# Patient Record
Sex: FEMALE | Race: WHITE | NOT HISPANIC OR LATINO | Employment: FULL TIME | URBAN - METROPOLITAN AREA
[De-identification: names, ages, dates, MRNs, and addresses within clinical notes are randomized per-mention and may not be internally consistent; named-entity substitution may affect disease eponyms.]

---

## 2019-07-22 ENCOUNTER — OFFICE VISIT (OUTPATIENT)
Dept: OBSTETRICS AND GYNECOLOGY | Facility: CLINIC | Age: 24
End: 2019-07-22
Payer: COMMERCIAL

## 2019-07-22 VITALS — HEIGHT: 64 IN | WEIGHT: 149.25 LBS | BODY MASS INDEX: 25.48 KG/M2

## 2019-07-22 DIAGNOSIS — Z11.3 SCREENING FOR STD (SEXUALLY TRANSMITTED DISEASE): ICD-10-CM

## 2019-07-22 DIAGNOSIS — Z30.017 ENCOUNTER FOR INITIAL PRESCRIPTION OF IMPLANTABLE SUBDERMAL CONTRACEPTIVE: ICD-10-CM

## 2019-07-22 DIAGNOSIS — Z01.419 ENCOUNTER FOR GYNECOLOGICAL EXAMINATION WITHOUT ABNORMAL FINDING: Primary | ICD-10-CM

## 2019-07-22 PROCEDURE — 88175 CYTOPATH C/V AUTO FLUID REDO: CPT

## 2019-07-22 PROCEDURE — 99999 PR PBB SHADOW E&M-NEW PATIENT-LVL III: CPT | Mod: PBBFAC,,, | Performed by: NURSE PRACTITIONER

## 2019-07-22 PROCEDURE — 99385 PREV VISIT NEW AGE 18-39: CPT | Mod: S$GLB,,, | Performed by: NURSE PRACTITIONER

## 2019-07-22 PROCEDURE — 99385 PR PREVENTIVE VISIT,NEW,18-39: ICD-10-PCS | Mod: S$GLB,,, | Performed by: NURSE PRACTITIONER

## 2019-07-22 PROCEDURE — 87491 CHLMYD TRACH DNA AMP PROBE: CPT

## 2019-07-22 PROCEDURE — 99999 PR PBB SHADOW E&M-NEW PATIENT-LVL III: ICD-10-PCS | Mod: PBBFAC,,, | Performed by: NURSE PRACTITIONER

## 2019-07-22 RX ORDER — NORETHINDRONE ACETATE AND ETHINYL ESTRADIOL AND FERROUS FUMARATE 1MG-20(21)
KIT ORAL
Refills: 2 | COMMUNITY
Start: 2019-07-01 | End: 2021-09-02 | Stop reason: SDUPTHER

## 2019-07-22 NOTE — PATIENT INSTRUCTIONS
The Range of Pap Test Results  When your Pap test is sent to the lab, the lab studies your cell samples and reports any abnormal cell changes. Your health care provider can discuss these changes with you. In some cases, an abnormal Pap test is due to an infection. More serious cell changes range from dysplasia to cancer. Talk to your health care provider about your Pap test.    Normal results  Cervical cells, even normal ones, are always changing. As they mature, normal squamous cells move from deeper layers within the cervix. Over time, these cells flatten and cover the surface of the cervix. Within the cervical canal, the cells are different. These glandular cells are taller and not as flat as the cells on the surface of the cervix. When a Pap test sample shows healthy cells of both types, the results are negative. Keep having Pap tests as often as directed.  Abnormal results  A positive Pap test result means some cells in the sample showed abnormal changes. These results are grouped by the type of cell change and the location, or extent, of the changes. Depending on the results, you may need further testing.  · Inflammation: Noncancerous changes are present. They may be due to normal cell repair. Or, they may be caused by an infection, such as HPV or yeast. Further testing may be needed. (Also called reactive cellular changes.)  · Atypical squamous cells: Test results are unclear. Cells on the surface of the cervix show changes, but their significance is not yet known. Testing for HPV and other sexually transmitted infections (STIs) may be needed. Treatment may be required. (Reported as ASC-US or ASC-H.)  · Atypical glandular cells: Cells lining the cervical canal show abnormal changes. Further testing is likely. You may also have treatment to destroy or remove problem cells. (Reported as AGC.)  · Mild dysplasia: Cells show distinct changes. More testing or HPV typing may be done. You may also have treatment to  destroy or remove problem cells. (Reported as low-grade ERROL or SHANTAL 1.)  · Moderate to severe dysplasia: Cells show precancerous changes. Or, noninvasive cancer (carcinoma in situ) may be present. Treatment to destroy or remove problem cells is likely. (Reported as high-grade ERROL or SHANTAL 2 or SHANTAL 3.)  · Cancer: Different types of cancer may be detected by your Pap test. More tests to assess the cancer's extent are likely. The type of treatment will depend on the test results and other factors, such as age and health history. (Reported as squamous cell carcinoma, endocervical adenocarcinoma in situ, or adenocarcinoma.)  Date Last Reviewed: 5/12/2015  © 2513-6677 [employer]. [address] [place] [address], [place] [address]. All rights reserved. This information is not intended as a substitute for professional medical care. Always follow your healthcare professional's instructions.        Breast Health: Breast Self-Awareness  What is breast self-awareness?  Breast self-awareness is knowing how your breasts normally look and feel. Your breasts change as you go through different stages of your life. So its important to learn what is normal for your breasts. Breast self-awareness helps you notice any changes in your breasts right away. Report any changes to your healthcare provider.  Why is breast self-awareness important?  Many experts now say that women should focus on breast self-awareness instead of doing a breast self-examination (BSE). These experts include the American Cancer Society, the U.S. Preventive Services Task Force, and the American Congress of Obstetricians and Gynecologists. Some experts even advise not teaching women to do a BSE. Thats because research hasnt shown a clear benefit to doing BSEs.  Breast self-awareness is different than a BSE. Breast self-awareness isnt about following a certain method and schedule. Its about knowing what's normal for your breasts. That way you can notice even  small changes right away. If you see any changes, report them to your healthcare provider.  Changes to look for  Call your healthcare provider if you find any changes in your breasts that concern you. These changes may include:  · A lump  · Nipple discharge other than breast milk, especially a bloody discharge  · Swelling  · A change in size or shape  · Skin irritation, such as redness, thickening, or dimpling of the skin  · Swollen lymph nodes in the armpit  · Nipple problems, such as pain or redness  If you find a lump  Contact your provider if you find lumpiness in one breast, feel something different in the tissue, or feel a definite lump. Sometimes lumpiness may be due to menstrual changes. But there may be reason for concern.  Your provider may want to see you right away if you have:  · Nipple discharge that is bloody  · Skin changes on your breast, such as dimpling or puckering  Its normal to be upset if you find a lump. But its important to contact your provider right away. Remember that most breast lumps are benign. This means they are not cancer.  Date Last Reviewed: 8/10/2015  © 7054-8083 [employer]. [address] [place] [address]. All rights reserved. This information is not intended as a substitute for professional medical care. Always follow your healthcare professional's instructions.        Etonogestrel implant  What is this medicine?  ETONOGESTREL (et oh noemí GUALBERTO trel) is a contraceptive (birth control) device. It is used to prevent pregnancy. It can be used for up to 3 years.  How should I use this medicine?  This device is inserted just under the skin on the inner side of your upper arm by a health care professional.  Talk to your pediatrician regarding the use of this medicine in children. Special care may be needed.  What side effects may I notice from receiving this medicine?  Side effects that you should report to your doctor or health care professional as soon as  possible:  · allergic reactions like skin rash, itching or hives, swelling of the face, lips, or tongue  · breast lumps  · changes in emotions or moods  · depressed mood  · heavy or prolonged menstrual bleeding  · pain, irritation, swelling, or bruising at the insertion site  · scar at site of insertion  · signs of infection at the insertion site such as fever, and skin redness, pain or discharge  · signs of pregnancy  · signs and symptoms of a blood clot such as breathing problems; changes in vision; chest pain; severe, sudden headache; pain, swelling, warmth in the leg; trouble speaking; sudden numbness or weakness of the face, arm or leg  · signs and symptoms of liver injury like dark yellow or brown urine; general ill feeling or flu-like symptoms; light-colored stools; loss of appetite; nausea; right upper belly pain; unusually weak or tired; yellowing of the eyes or skin  · unusual vaginal bleeding, discharge  · signs and symptoms of a stroke like changes in vision; confusion; trouble speaking or understanding; severe headaches; sudden numbness or weakness of the face, arm or leg; trouble walking; dizziness; loss of balance or coordination  Side effects that usually do not require medical attention (Report these to your doctor or health care professional if they continue or are bothersome.):  · acne  · back pain  · breast pain  · changes in weight  · dizziness  · general ill feeling or flu-like symptoms  · headache  · irregular menstrual bleeding  · nausea  · sore throat  · vaginal irritation or inflammation  What may interact with this medicine?  Do not take this medicine with any of the following medications:  · amprenavir  · bosentan  · fosamprenavir  This medicine may also interact with the following medications:  · barbiturate medicines for inducing sleep or treating seizures  · certain medicines for fungal infections like ketoconazole and itraconazole  · griseofulvin  · medicines to treat seizures like  carbamazepine, felbamate, oxcarbazepine, phenytoin, topiramate  · modafinil  · phenylbutazone  · rifampin  · some medicines to treat HIV infection like atazanavir, indinavir, lopinavir, nelfinavir, tipranavir, ritonavir  · Angela's wort  What if I miss a dose?  This does not apply.  Where should I keep my medicine?  This drug is given in a hospital or clinic and will not be stored at home.  What should I tell my health care provider before I take this medicine?  They need to know if you have any of these conditions:  · abnormal vaginal bleeding  · blood vessel disease or blood clots  · cancer of the breast, cervix, or liver  · depression  · diabetes  · gallbladder disease  · headaches  · heart disease or recent heart attack  · high blood pressure  · high cholesterol  · kidney disease  · liver disease  · renal disease  · seizures  · tobacco smoker  · an unusual or allergic reaction to etonogestrel, other hormones, anesthetics or antiseptics, medicines, foods, dyes, or preservatives  · pregnant or trying to get pregnant  · breast-feeding  What should I watch for while using this medicine?  This product does not protect you against HIV infection (AIDS) or other sexually transmitted diseases.  You should be able to feel the implant by pressing your fingertips over the skin where it was inserted. Contact your doctor if you cannot feel the implant, and use a non-hormonal birth control method (such as condoms) until your doctor confirms that the implant is in place. If you feel that the implant may have broken or become bent while in your arm, contact your healthcare provider.  NOTE:This sheet is a summary. It may not cover all possible information. If you have questions about this medicine, talk to your doctor, pharmacist, or health care provider. Copyright© 2017 [employer]

## 2019-07-22 NOTE — PROGRESS NOTES
"CC: Well woman exam    Chichi Greer is a 23 y.o. female  presents for well woman exam.  LMP: Patient's last menstrual period was 2019..  No issues, problems, or complaints.    Wants to change from ocp to nexplanon. Going [place] of [place] on [place], will get back with us once she has returned.     History reviewed. No pertinent past medical history.  History reviewed. No pertinent surgical history.  Social History     Socioeconomic History    Marital status: Single     Spouse name: Not on file    Number of children: Not on file    Years of education: Not on file    Highest education level: Not on file   Occupational History    Not on file   Social Needs    Financial resource strain: Not on file    Food insecurity:     Worry: Not on file     Inability: Not on file    Transportation needs:     Medical: Not on file     Non-medical: Not on file   Tobacco Use    Smoking status: Never Smoker    Smokeless tobacco: Never Used   Substance and Sexual Activity    Alcohol use: Yes    Drug use: Never    Sexual activity: Yes     Birth control/protection: OCP   Lifestyle    Physical activity:     Days per week: Not on file     Minutes per session: Not on file    Stress: Not on file   Relationships    Social connections:     Talks on phone: Not on file     Gets together: Not on file     Attends Hoahaoism service: Not on file     Active member of club or organization: Not on file     Attends meetings of clubs or organizations: Not on file     Relationship status: Not on file   Other Topics Concern    Not on file   Social History Narrative    Not on file     History reviewed. No pertinent family history.  OB History        0    Para   0    Term   0       0    AB   0    Living   0       SAB   0    TAB   0    Ectopic   0    Multiple   0    Live Births   0                 Ht 5' 4" (1.626 m)   Wt 67.7 kg (149 lb 4 oz)   LMP 2019   BMI 25.62 kg/m²       ROS:  GENERAL: Denies weight gain " or weight loss. Feeling well overall.   SKIN: Denies rash or lesions.   HEAD: Denies head injury or headache.   NODES: Denies enlarged lymph nodes.   CHEST: Denies chest pain or shortness of breath.   CARDIOVASCULAR: Denies palpitations or left sided chest pain.   ABDOMEN: No abdominal pain, constipation, diarrhea, nausea, vomiting or rectal bleeding.   URINARY: No frequency, dysuria, hematuria, or burning on urination.  REPRODUCTIVE: See HPI.   BREASTS: The patient performs breast self-examination and denies pain, lumps, or nipple discharge.   HEMATOLOGIC: No easy bruisability or excessive bleeding.   MUSCULOSKELETAL: Denies joint pain or swelling.   NEUROLOGIC: Denies syncope or weakness.   PSYCHIATRIC: Denies depression, anxiety or mood swings.    PHYSICAL EXAM:  APPEARANCE: Well nourished, well developed, in no acute distress.  AFFECT: WNL, alert and oriented x 3  SKIN: No acne or hirsutism  NECK: Neck symmetric without masses or thyromegaly  NODES: No inguinal, cervical, axillary, or femoral lymph node enlargement  CHEST: Good respiratory effect  ABDOMEN: Soft.  No tenderness or masses.  No hepatosplenomegaly.  No hernias.  BREASTS: Symmetrical, no skin changes or visible lesions.  No palpable masses, nipple discharge bilaterally.  PELVIC: Normal external genitalia without lesions.  Normal hair distribution.  Adequate perineal body, normal urethral meatus.  Vagina moist and well rugated without lesions or discharge.  Cervix pink, without lesions, discharge or tenderness.  No significant cystocele or rectocele.  Bimanual exam shows uterus to be normal size, regular, mobile and nontender.  Adnexa without masses or tenderness.    EXTREMITIES: No edema.  Physical Exam    1. Encounter for gynecological examination without abnormal finding  Liquid-based pap smear, screening   2. Screening for STD (sexually transmitted disease)  C. trachomatis/N. gonorrhoeae by AMP DNA   3. Encounter for initial prescription of  implantable subdermal contraceptive      AND PLAN:    Patient was counseled today on A.C.S. Pap guidelines and recommendations for yearly pelvic exams, mammograms and monthly self breast exams; to see her PCP for other health maintenance.

## 2019-07-23 ENCOUNTER — PATIENT MESSAGE (OUTPATIENT)
Dept: OBSTETRICS AND GYNECOLOGY | Facility: CLINIC | Age: 24
End: 2019-07-23

## 2019-07-23 LAB
C TRACH DNA SPEC QL NAA+PROBE: NOT DETECTED
N GONORRHOEA DNA SPEC QL NAA+PROBE: NOT DETECTED

## 2019-07-30 ENCOUNTER — PATIENT MESSAGE (OUTPATIENT)
Dept: OBSTETRICS AND GYNECOLOGY | Facility: CLINIC | Age: 24
End: 2019-07-30

## 2019-08-29 ENCOUNTER — TELEPHONE (OUTPATIENT)
Dept: OBSTETRICS AND GYNECOLOGY | Facility: CLINIC | Age: 24
End: 2019-08-29

## 2019-08-29 NOTE — TELEPHONE ENCOUNTER
----- Message from Michael Russell sent at 8/29/2019  3:03 PM CDT -----  Contact: Bharti  She is calling stating that they received the patient's enrollment for to get the Nexplanon birth control device but there is some missing information needed to complete the processing,please advise as soon as possible #[phone] &  fax#[phone]

## 2019-10-18 ENCOUNTER — TELEPHONE (OUTPATIENT)
Dept: OBSTETRICS AND GYNECOLOGY | Facility: CLINIC | Age: 24
End: 2019-10-18

## 2019-10-18 NOTE — TELEPHONE ENCOUNTER
Patient's Nexplanon received at [facility].  Placed in med room. Attempted to contact patient.  No answer. No voicemail, so unable to leave message. Patient needs to be notified and scheduled.

## 2020-07-01 ENCOUNTER — OFFICE VISIT (OUTPATIENT)
Dept: URGENT CARE | Facility: CLINIC | Age: 25
End: 2020-07-01
Payer: COMMERCIAL

## 2020-07-01 VITALS
TEMPERATURE: 99 F | HEIGHT: 64 IN | RESPIRATION RATE: 19 BRPM | WEIGHT: 149 LBS | BODY MASS INDEX: 25.44 KG/M2 | SYSTOLIC BLOOD PRESSURE: 122 MMHG | OXYGEN SATURATION: 98 % | DIASTOLIC BLOOD PRESSURE: 79 MMHG | HEART RATE: 52 BPM

## 2020-07-01 DIAGNOSIS — Z11.59 SCREENING FOR VIRAL DISEASE: Primary | ICD-10-CM

## 2020-07-01 PROCEDURE — U0003 INFECTIOUS AGENT DETECTION BY NUCLEIC ACID (DNA OR RNA); SEVERE ACUTE RESPIRATORY SYNDROME CORONAVIRUS 2 (SARS-COV-2) (CORONAVIRUS DISEASE [COVID-19]), AMPLIFIED PROBE TECHNIQUE, MAKING USE OF HIGH THROUGHPUT TECHNOLOGIES AS DESCRIBED BY CMS-2020-01-R: HCPCS

## 2020-07-01 PROCEDURE — 99202 PR OFFICE/OUTPT VISIT, NEW, LEVL II, 15-29 MIN: ICD-10-PCS | Mod: S$GLB,,, | Performed by: NURSE PRACTITIONER

## 2020-07-01 PROCEDURE — 99202 OFFICE O/P NEW SF 15 MIN: CPT | Mod: S$GLB,,, | Performed by: NURSE PRACTITIONER

## 2020-07-01 NOTE — PROGRESS NOTES
"Subjective:       Patient ID: Chichi Greer is a 24 y.o. female.    Vitals:  height is 5' 4" (1.626 m) and weight is 67.6 kg (149 lb). Her temperature is 98.6 °F (37 °C). Her blood pressure is 122/79 and her pulse is 52 (abnormal). Her respiration is 19 and oxygen saturation is 98%.     Chief Complaint: No chief complaint on file.    Pt states she has reshma exposed to a positive covid person. For the last 2 days she has been having a running nose. Pt hasn't taken anything for her symptoms.  Provider note begins below  Pt denies fever, cough or SOB.      Constitution: Negative for chills, fatigue and fever.   HENT: Negative for congestion and sore throat.    Neck: Negative for painful lymph nodes.   Cardiovascular: Negative for chest pain and leg swelling.   Eyes: Negative for double vision and blurred vision.   Respiratory: Negative for cough and shortness of breath.    Gastrointestinal: Negative for nausea, vomiting and diarrhea.   Genitourinary: Negative for dysuria, frequency, urgency and history of kidney stones.   Musculoskeletal: Negative for joint pain, joint swelling, muscle cramps and muscle ache.   Skin: Negative for color change, pale, rash and bruising.   Allergic/Immunologic: Negative for seasonal allergies.   Neurological: Negative for dizziness, history of vertigo, light-headedness, passing out and headaches.   Hematologic/Lymphatic: Negative for swollen lymph nodes.   Psychiatric/Behavioral: Negative for nervous/anxious, sleep disturbance and depression. The patient is not nervous/anxious.        Objective:      Physical Exam   Constitutional: She is oriented to person, place, and time.   HENT:   Head: Normocephalic and atraumatic.   Cardiovascular: Normal rate.   Pulmonary/Chest: Effort normal. No respiratory distress.   Abdominal: Normal appearance.   Neurological: She is alert and oriented to person, place, and time.   Skin: Skin is warm and dry.   Psychiatric: Her behavior is normal. Mood normal. "         Assessment:       1. Screening for viral disease        Plan:       Counseled patient and answered questions in regards to COVID-19 testing and diagnosis.    Screening for viral disease  -     COVID-19 Routine Screening         Patient Instructions   Guidelines for General Prevention of COVID-19    o Take steps to protect yourself from COVID-19. Perform hand hygiene frequently. Wash your hands often with soap and water for at least 20 seconds of use and alcohol-based hand , covering all surfaces of your hands and rubbing them together until they feel dry.  o Avoid touching your eyes, nose, and mouth with unwashed hands.  o Avoid close contact with people and stay home if youre sick, except to get medical care.   o Cover coughs and sneezes with a tissue, or use the inside of your elbow. Immediately wash your hands or use hand .     For more information, see CDC link below:    https://www.cdc.gov/coronavirus/2019-ncov/hcp/guidance-prevent-spread.html#precautions

## 2020-07-05 LAB — SARS-COV-2 RNA RESP QL NAA+PROBE: NEGATIVE

## 2020-07-06 ENCOUNTER — TELEPHONE (OUTPATIENT)
Dept: URGENT CARE | Facility: CLINIC | Age: 25
End: 2020-07-06

## 2020-07-06 NOTE — TELEPHONE ENCOUNTER
Patient has reviewed their negative COVID19 result in My Chart.    Your test was NEGATIVE for COVID-19 (coronavirus).  If you still have symptoms, treat with rest, fluids, and over-the-counter medications.  Continue to follow local guidelines and practice proper handwashing.     If your symptoms worsen or if you have any other concerns, please contact [facility] at [phone].     Sincerely,    Joe Roldan III, NP

## 2020-08-12 ENCOUNTER — OFFICE VISIT (OUTPATIENT)
Dept: URGENT CARE | Facility: CLINIC | Age: 25
End: 2020-08-12
Payer: COMMERCIAL

## 2020-08-12 VITALS
HEIGHT: 64 IN | TEMPERATURE: 99 F | RESPIRATION RATE: 18 BRPM | OXYGEN SATURATION: 99 % | BODY MASS INDEX: 25.61 KG/M2 | WEIGHT: 150 LBS | HEART RATE: 58 BPM

## 2020-08-12 DIAGNOSIS — J03.90 EXUDATIVE TONSILLITIS: Primary | ICD-10-CM

## 2020-08-12 LAB
CTP QC/QA: YES
CTP QC/QA: YES
HETEROPH AB SER QL: NEGATIVE
MOLECULAR STREP A: NEGATIVE

## 2020-08-12 PROCEDURE — 86308 HETEROPHILE ANTIBODY SCREEN: CPT | Mod: QW,S$GLB,, | Performed by: PHYSICIAN ASSISTANT

## 2020-08-12 PROCEDURE — 99214 OFFICE O/P EST MOD 30 MIN: CPT | Mod: S$GLB,,, | Performed by: PHYSICIAN ASSISTANT

## 2020-08-12 PROCEDURE — 86308 POCT INFECTIOUS MONONUCLEOSIS: ICD-10-PCS | Mod: QW,S$GLB,, | Performed by: PHYSICIAN ASSISTANT

## 2020-08-12 PROCEDURE — 87651 STREP A DNA AMP PROBE: CPT | Mod: QW,S$GLB,, | Performed by: PHYSICIAN ASSISTANT

## 2020-08-12 PROCEDURE — 99214 PR OFFICE/OUTPT VISIT, EST, LEVL IV, 30-39 MIN: ICD-10-PCS | Mod: S$GLB,,, | Performed by: PHYSICIAN ASSISTANT

## 2020-08-12 PROCEDURE — 87651 POCT STREP A MOLECULAR: ICD-10-PCS | Mod: QW,S$GLB,, | Performed by: PHYSICIAN ASSISTANT

## 2020-08-12 RX ORDER — AMOXICILLIN 500 MG/1
500 CAPSULE ORAL EVERY 12 HOURS
Qty: 20 CAPSULE | Refills: 0 | Status: SHIPPED | OUTPATIENT
Start: 2020-08-12 | End: 2020-08-22

## 2020-08-12 NOTE — PATIENT INSTRUCTIONS
- Rest.    - Drink plenty of fluids.    - Acetaminophen (tylenol) or Ibuprofen (advil,motrin) as directed as needed for fever/pain. Avoid tylenol if you have a history of liver disease. Do not take ibuprofen if you have a history of GI bleeding, kidney disease, or if you take blood thinners.   - take ibuprofen 600-800 mg every 6-8 hours for pain and inflammation.  You can also take Tylenol/acetaminophen 650-1000 mg every 6-8 hours for added pain relief.    - You have been given an antibiotic (AMOXICILLIN) to treat your condition today.    - Please complete the antibiotic as directed on the bottle.   - If you are female and on oral birth control pills, use additional methods to prevent pregnancy while on antibiotics and for one cycle after.   - you can take over-the-counter probiotics during and after antibiotic use to help preserve gut rola and reduce gastrointestinal symptoms  - You were given a prescription for antibiotic, but do not start taking it yet.  Wait 2-3 days to see if your symptoms improve without it.  If they don't or you get significantly worse, then start the antibiotics.    - Follow up with your PCP or specialty clinic as directed in the next 1-2 weeks if not improved or as needed.  You can call [phone] to schedule an appointment with the appropriate provider.    - Go to the ER or seek medical attention immediately if you develop new or worsening symptoms.    - You must understand that you have received an Urgent Care treatment only and that you may be released before all of your medical problems are known or treated.   - You, the patient, will arrange for follow up care as instructed.   - If your condition worsens or fails to improve we recommend that you receive another evaluation at the ER immediately or contact your PCP to discuss your concerns or return here.         Self-Care for Sore Throats    Sore throats happen for many reasons, such as colds, allergies, and infections caused by  viruses or bacteria. In any case, your throat becomes red and sore. Your goal for self-care is to reduce your discomfort while giving your throat a chance to heal.  Moisten and soothe your throat  Tips include the following:  · Try a sip of water first thing after waking up.  · Keep your throat moist by drinking 6 or more glasses of clear liquids every day.  · Run a cool-air humidifier in your room overnight.  · Avoid cigarette smoke.   · Suck on throat lozenges, cough drops, hard candy, ice chips, or frozen fruit-juice bars. Use the sugar-free versions if your diet or medical condition requires them.  Gargle to ease irritation  Gargling every hour or 2 can ease irritation. Try gargling with 1 of these solutions:  · 1/4 teaspoon of salt in 1/2 cup of warm water  · An over-the-counter anesthetic gargle  Use medicine for more relief  Over-the-counter medicine can reduce sore throat symptoms. Ask your pharmacist if you have questions about which medicine to use:  · Ease pain with anesthetic sprays. Aspirin or an aspirin substitute also helps. Remember, never give aspirin to anyone 18 or younger, or if you are already taking blood thinners.   · For sore throats caused by allergies, try antihistamines to block the allergic reaction.  · Remember: unless a sore throat is caused by a bacterial infection, antibiotics wont help you.  Prevent future sore throats  Prevention tips include the following:  · Stop smoking or reduce contact with secondhand smoke. Smoke irritates the tender throat lining.  · Limit contact with pets and with allergy-causing substances, such as pollen and mold.  · When youre around someone with a sore throat or cold, wash your hands often to keep viruses or bacteria from spreading.  · Dont strain your vocal cords.  Call your healthcare provider  Contact your healthcare provider if you have:  · A temperature over 101°F (38.3°C)  · White spots on the throat  · Great difficulty swallowing  · Trouble  breathing  · A skin rash  · Recent exposure to someone else with strep bacteria  · Severe hoarseness and swollen glands in the neck or jaw   Date Last Reviewed: 8/1/2016 © 2000-2017 [employer]. [address] [place] [address], [place] [address]. All rights reserved. This information is not intended as a substitute for professional medical care. Always follow your healthcare professional's instructions.        When You Have a Sore Throat    A sore throat can be painful. There are many reasons why you may have a sore throat. Your healthcare provider will work with you to find the cause of your sore throat. He or she will also find the best treatment for you.  What causes a sore throat?  Sore throats can be caused or worsened by:  · Cold or flu viruses  · Bacteria  · Irritants such as tobacco smoke or air pollution  · Acid reflux  A healthy throat  The tonsils are on the sides of the throat near the base of the tongue. They collect viruses and bacteria and help fight infection. The throat (pharynx) is the passage for air. Mucus from the nasal cavity also moves down the passage.  An inflamed throat  The tonsils and pharynx can become inflamed due to a cold or flu virus. Postnasal drip (excess mucus draining from the nasal cavity) can irritate the throat. It can also make the throat or tonsils more likely to be infected by bacteria. Severe, untreated tonsillitis in children or adults can cause a pocket of pus (abscess) to form near the tonsil.  Your evaluation  A medical evaluation can help find the cause of your sore throat. It can also help your healthcare provider choose the best treatment for you. The evaluation may include a health history, physical exam, and diagnostic tests.  Health history  Your healthcare provider may ask you the following:  · How long has the sore throat lasted and how have you been treating it?  · Do you have any other symptoms, such as body aches, fever, or cough?  · Does your sore  throat recur? If so, how often? How many days of school or work have you missed because of a sore throat?  · Do you have trouble eating or swallowing?  · Have you been told that you snore or have other sleep problems?  · Do you have bad breath?  · Do you cough up bad-tasting mucus?  Physical exam  During the exam, your healthcare provider checks your ears, nose, and throat for problems. He or she also checks for swelling in the neck, and may listen to your chest.  Possible tests  Other tests your healthcare provider may perform include:  · A throat swab to check for bacteria such as streptococcus (the bacteria that causes strep throat)  · A blood test to check for mononucleosis (a viral infection)  · A chest X-ray to rule out pneumonia, especially if you have a cough  Treating a sore throat  Treatment depends on many factors. What is the likely cause? Is the problem recent? Does it keep coming back? In many cases, the best thing to do is to treat the symptoms, rest, and let the problem heal itself. Antibiotics may help clear up some bacterial infections. For cases of severe or recurring tonsillitis, the tonsils may need to be removed.  Relieving your symptoms  · Dont smoke, and avoid secondhand smoke.  · For children, try throat sprays or Popsicles. Adults and older children may try lozenges.  · Drink warm liquids to soothe the throat and help thin mucus. Avoid alcohol, spicy foods, and acidic drinks such as orange juice. These can irritate the throat.  · Gargle with warm saltwater (1 teaspoon of salt to 8 ounces of warm water).  · Use a humidifier to keep air moist and relieve throat dryness.  · Try over-the-counter pain relievers such as acetaminophen or ibuprofen. Use as directed, and dont exceed the recommended dose. Dont give aspirin to children.   Are antibiotics needed?  If your sore throat is due to a bacterial infection, antibiotics may speed healing and prevent complications. Although group A  "streptococcus ("strep throat" or GAS) is the major treatable infection for a sore throat, GAS causes only 5% to 15% of sore throats in adults who seek medical care. Most sore throats are caused by cold or flu viruses. And antibiotics dont treat viral illness. In fact, using antibiotics when theyre not needed may produce bacteria that are harder to kill. Your healthcare provider will prescribe antibiotics only if he or she thinks they are likely to help.  If antibiotics are prescribed  Take the medicine exactly as directed. Be sure to finish your prescription even if youre feeling better. And be sure to ask your healthcare provider or pharmacist what side effects are common and what to do about them.  Is surgery needed?  In some cases, tonsils need to be removed. This is often done as outpatient (same-day) surgery. Your healthcare provider may advise removing the tonsils in cases of:  · Several severe bouts of tonsillitis in a year. Severe episodes include those that lead to missed days of school or work, or that need to be treated with antibiotics.  · Tonsillitis that causes breathing problems during sleep  · Tonsillitis caused by food particles collecting in pouches in the tonsils (cryptic tonsillitis)  Call your healthcare provider if any of the following occur:  · Symptoms worsen, or new symptoms develop.  · Swollen tonsils make breathing difficult.  · The pain is severe enough to keep you from drinking liquids.  · A skin rash, hives, or wheezing develops. Any of these could signal an allergic reaction to antibiotics.  · Symptoms dont improve within a week.  · Symptoms dont improve within 2 to 3 days of starting antibiotics.   Date Last Reviewed: 10/1/2016  © 4699-8336 "[employer] [address] [place] [address], [place] [address]. All rights reserved. This information is not intended as a substitute for professional medical care. Always follow your healthcare professional's instructions.        "

## 2020-08-12 NOTE — PROGRESS NOTES
"Subjective:       Patient ID: Chichi Greer is a 24 y.o. female.    Vitals:  height is 5' 4" (1.626 m) and weight is 68 kg (150 lb). Her temperature is 98.5 °F (36.9 °C). Her pulse is 58 (abnormal). Her respiration is 18 and oxygen saturation is 99%.     Chief Complaint: Sore Throat    Patient presents with chief complaint of sore throat.  She states this began last night.  Sore throat was worse last night and this morning. She also saw white exudate on both tonsils.  She denies other associated symptoms.  Patient is going out of town in the next few days, which provoked her to come in today to get checked out.  Patient is going back packing in [place] and will not have good access to healthcare.  She denies any fever or difficulty swallowing.  No nasal congestion or cough.       Constitution: Negative for chills, sweating, fatigue and fever.   HENT: Negative for sinus pain, sinus pressure and voice change.    Neck: Negative for neck stiffness and painful lymph nodes.   Cardiovascular: Negative for chest pain, leg swelling and sob on exertion.   Eyes: Negative for eye discharge, eye itching, eye pain and eye redness.   Respiratory: Negative for chest tightness, sputum production, bloody sputum, COPD, wheezing and asthma.    Gastrointestinal: Negative for nausea.   Genitourinary: Negative for dysuria, frequency, urgency and flank pain.   Musculoskeletal: Negative for muscle ache.   Skin: Negative for rash.   Allergic/Immunologic: Negative for seasonal allergies and asthma.   Neurological: Negative for dizziness, altered mental status and loss of consciousness.   Hematologic/Lymphatic: Negative for swollen lymph nodes.   Psychiatric/Behavioral: Negative for altered mental status.       Objective:      Physical Exam   Constitutional: She is oriented to person, place, and time. She appears well-developed. She is cooperative.  Non-toxic appearance. She does not appear ill. No distress.   HENT:   Head: Normocephalic and " atraumatic.   Ears:   Right Ear: Hearing, tympanic membrane, external ear and ear canal normal.   Left Ear: Hearing, tympanic membrane, external ear and ear canal normal.   Nose: Nose normal. No mucosal edema, rhinorrhea or nasal deformity. No epistaxis. Right sinus exhibits no maxillary sinus tenderness and no frontal sinus tenderness. Left sinus exhibits no maxillary sinus tenderness and no frontal sinus tenderness.   Mouth/Throat: Uvula is midline and mucous membranes are normal. No trismus in the jaw. Normal dentition. No uvula swelling. Posterior oropharyngeal erythema present. No oropharyngeal exudate, posterior oropharyngeal edema, tonsillar abscesses or cobblestoning. Tonsils are 1+ on the right. Tonsils are 1+ on the left. Tonsillar exudate.   Uvula midline      Comments: Uvula midline  Eyes: Conjunctivae and lids are normal. No scleral icterus.   Neck: Trachea normal, full passive range of motion without pain and phonation normal. Neck supple. No neck rigidity. No edema and no erythema present.   Cardiovascular: Normal rate, regular rhythm, normal heart sounds and normal pulses.   Pulmonary/Chest: Effort normal and breath sounds normal. No accessory muscle usage or stridor. No tachypnea and no bradypnea. No respiratory distress. She has no decreased breath sounds. She has no wheezes. She has no rhonchi. She has no rales.   Abdominal: Normal appearance.   Musculoskeletal: Normal range of motion.         General: No deformity.   Lymphadenopathy:        Head (right side): No submandibular, no preauricular and no posterior auricular adenopathy present.        Head (left side): No submandibular, no preauricular and no posterior auricular adenopathy present.     She has cervical adenopathy.        Right cervical: Superficial cervical adenopathy present. No posterior cervical adenopathy present.       Left cervical: Superficial cervical adenopathy present. No posterior cervical adenopathy present.   Neurological:  She is alert and oriented to person, place, and time. She exhibits normal muscle tone. Coordination normal.   Skin: Skin is warm, dry, intact, not diaphoretic and not pale. Psychiatric: Her speech is normal and behavior is normal. Judgment and thought content normal.   Nursing note and vitals reviewed.        Results for orders placed or performed in visit on 08/12/20   POCT Strep A, Molecular   Result Value Ref Range    Molecular Strep A, POC Negative Negative     Acceptable Yes    POCT Infectious mononucleosis antibody   Result Value Ref Range    Monospot Negative Negative     Acceptable Yes        Assessment:       1. Exudative tonsillitis        Plan:         Exudative tonsillitis  -     POCT Strep A, Molecular  -     amoxicillin (AMOXIL) 500 MG capsule; Take 1 capsule (500 mg total) by mouth every 12 (twelve) hours. for 10 days  Dispense: 20 capsule; Refill: 0  -     POCT Infectious mononucleosis antibody      Patient Instructions     - Rest.    - Drink plenty of fluids.    - Acetaminophen (tylenol) or Ibuprofen (advil,motrin) as directed as needed for fever/pain. Avoid tylenol if you have a history of liver disease. Do not take ibuprofen if you have a history of GI bleeding, kidney disease, or if you take blood thinners.   - take ibuprofen 600-800 mg every 6-8 hours for pain and inflammation.  You can also take Tylenol/acetaminophen 650-1000 mg every 6-8 hours for added pain relief.    - You have been given an antibiotic (AMOXICILLIN) to treat your condition today.    - Please complete the antibiotic as directed on the bottle.   - If you are female and on oral birth control pills, use additional methods to prevent pregnancy while on antibiotics and for one cycle after.   - you can take over-the-counter probiotics during and after antibiotic use to help preserve gut rola and reduce gastrointestinal symptoms  - You were given a prescription for antibiotic, but do not start taking  it yet.  Wait 2-3 days to see if your symptoms improve without it.  If they don't or you get significantly worse, then start the antibiotics.    - Follow up with your PCP or specialty clinic as directed in the next 1-2 weeks if not improved or as needed.  You can call [phone] to schedule an appointment with the appropriate provider.    - Go to the ER or seek medical attention immediately if you develop new or worsening symptoms.    - You must understand that you have received an Urgent Care treatment only and that you may be released before all of your medical problems are known or treated.   - You, the patient, will arrange for follow up care as instructed.   - If your condition worsens or fails to improve we recommend that you receive another evaluation at the ER immediately or contact your PCP to discuss your concerns or return here.         Self-Care for Sore Throats    Sore throats happen for many reasons, such as colds, allergies, and infections caused by viruses or bacteria. In any case, your throat becomes red and sore. Your goal for self-care is to reduce your discomfort while giving your throat a chance to heal.  Moisten and soothe your throat  Tips include the following:  · Try a sip of water first thing after waking up.  · Keep your throat moist by drinking 6 or more glasses of clear liquids every day.  · Run a cool-air humidifier in your room overnight.  · Avoid cigarette smoke.   · Suck on throat lozenges, cough drops, hard candy, ice chips, or frozen fruit-juice bars. Use the sugar-free versions if your diet or medical condition requires them.  Gargle to ease irritation  Gargling every hour or 2 can ease irritation. Try gargling with 1 of these solutions:  · 1/4 teaspoon of salt in 1/2 cup of warm water  · An over-the-counter anesthetic gargle  Use medicine for more relief  Over-the-counter medicine can reduce sore throat symptoms. Ask your pharmacist if you have questions about which medicine  to use:  · Ease pain with anesthetic sprays. Aspirin or an aspirin substitute also helps. Remember, never give aspirin to anyone 18 or younger, or if you are already taking blood thinners.   · For sore throats caused by allergies, try antihistamines to block the allergic reaction.  · Remember: unless a sore throat is caused by a bacterial infection, antibiotics wont help you.  Prevent future sore throats  Prevention tips include the following:  · Stop smoking or reduce contact with secondhand smoke. Smoke irritates the tender throat lining.  · Limit contact with pets and with allergy-causing substances, such as pollen and mold.  · When youre around someone with a sore throat or cold, wash your hands often to keep viruses or bacteria from spreading.  · Dont strain your vocal cords.  Call your healthcare provider  Contact your healthcare provider if you have:  · A temperature over 101°F (38.3°C)  · White spots on the throat  · Great difficulty swallowing  · Trouble breathing  · A skin rash  · Recent exposure to someone else with strep bacteria  · Severe hoarseness and swollen glands in the neck or jaw   Date Last Reviewed: 8/1/2016  © 4648-2220 [employer]. [address] [place] [address]. All rights reserved. This information is not intended as a substitute for professional medical care. Always follow your healthcare professional's instructions.        When You Have a Sore Throat    A sore throat can be painful. There are many reasons why you may have a sore throat. Your healthcare provider will work with you to find the cause of your sore throat. He or she will also find the best treatment for you.  What causes a sore throat?  Sore throats can be caused or worsened by:  · Cold or flu viruses  · Bacteria  · Irritants such as tobacco smoke or air pollution  · Acid reflux  A healthy throat  The tonsils are on the sides of the throat near the base of the tongue. They collect viruses and  bacteria and help fight infection. The throat (pharynx) is the passage for air. Mucus from the nasal cavity also moves down the passage.  An inflamed throat  The tonsils and pharynx can become inflamed due to a cold or flu virus. Postnasal drip (excess mucus draining from the nasal cavity) can irritate the throat. It can also make the throat or tonsils more likely to be infected by bacteria. Severe, untreated tonsillitis in children or adults can cause a pocket of pus (abscess) to form near the tonsil.  Your evaluation  A medical evaluation can help find the cause of your sore throat. It can also help your healthcare provider choose the best treatment for you. The evaluation may include a health history, physical exam, and diagnostic tests.  Health history  Your healthcare provider may ask you the following:  · How long has the sore throat lasted and how have you been treating it?  · Do you have any other symptoms, such as body aches, fever, or cough?  · Does your sore throat recur? If so, how often? How many days of school or work have you missed because of a sore throat?  · Do you have trouble eating or swallowing?  · Have you been told that you snore or have other sleep problems?  · Do you have bad breath?  · Do you cough up bad-tasting mucus?  Physical exam  During the exam, your healthcare provider checks your ears, nose, and throat for problems. He or she also checks for swelling in the neck, and may listen to your chest.  Possible tests  Other tests your healthcare provider may perform include:  · A throat swab to check for bacteria such as streptococcus (the bacteria that causes strep throat)  · A blood test to check for mononucleosis (a viral infection)  · A chest X-ray to rule out pneumonia, especially if you have a cough  Treating a sore throat  Treatment depends on many factors. What is the likely cause? Is the problem recent? Does it keep coming back? In many cases, the best thing to do is to treat the  "symptoms, rest, and let the problem heal itself. Antibiotics may help clear up some bacterial infections. For cases of severe or recurring tonsillitis, the tonsils may need to be removed.  Relieving your symptoms  · Dont smoke, and avoid secondhand smoke.  · For children, try throat sprays or Popsicles. Adults and older children may try lozenges.  · Drink warm liquids to soothe the throat and help thin mucus. Avoid alcohol, spicy foods, and acidic drinks such as orange juice. These can irritate the throat.  · Gargle with warm saltwater (1 teaspoon of salt to 8 ounces of warm water).  · Use a humidifier to keep air moist and relieve throat dryness.  · Try over-the-counter pain relievers such as acetaminophen or ibuprofen. Use as directed, and dont exceed the recommended dose. Dont give aspirin to children.   Are antibiotics needed?  If your sore throat is due to a bacterial infection, antibiotics may speed healing and prevent complications. Although group A streptococcus ("strep throat" or GAS) is the major treatable infection for a sore throat, GAS causes only 5% to 15% of sore throats in adults who seek medical care. Most sore throats are caused by cold or flu viruses. And antibiotics dont treat viral illness. In fact, using antibiotics when theyre not needed may produce bacteria that are harder to kill. Your healthcare provider will prescribe antibiotics only if he or she thinks they are likely to help.  If antibiotics are prescribed  Take the medicine exactly as directed. Be sure to finish your prescription even if youre feeling better. And be sure to ask your healthcare provider or pharmacist what side effects are common and what to do about them.  Is surgery needed?  In some cases, tonsils need to be removed. This is often done as outpatient (same-day) surgery. Your healthcare provider may advise removing the tonsils in cases of:  · Several severe bouts of tonsillitis in a year. Severe episodes include " those that lead to missed days of school or work, or that need to be treated with antibiotics.  · Tonsillitis that causes breathing problems during sleep  · Tonsillitis caused by food particles collecting in pouches in the tonsils (cryptic tonsillitis)  Call your healthcare provider if any of the following occur:  · Symptoms worsen, or new symptoms develop.  · Swollen tonsils make breathing difficult.  · The pain is severe enough to keep you from drinking liquids.  · A skin rash, hives, or wheezing develops. Any of these could signal an allergic reaction to antibiotics.  · Symptoms dont improve within a week.  · Symptoms dont improve within 2 to 3 days of starting antibiotics.   Date Last Reviewed: 10/1/2016  © 8033-0068 [employer] [place]. [address] [place] [address], [place] [address]. All rights reserved. This information is not intended as a substitute for professional medical care. Always follow your healthcare professional's instructions.

## 2020-11-09 ENCOUNTER — OFFICE VISIT (OUTPATIENT)
Dept: OBSTETRICS AND GYNECOLOGY | Facility: CLINIC | Age: 25
End: 2020-11-09
Payer: COMMERCIAL

## 2020-11-09 ENCOUNTER — LAB VISIT (OUTPATIENT)
Dept: LAB | Facility: OTHER | Age: 25
End: 2020-11-09
Attending: ADVANCED PRACTICE MIDWIFE
Payer: COMMERCIAL

## 2020-11-09 VITALS — WEIGHT: 160.94 LBS | BODY MASS INDEX: 27.48 KG/M2 | HEIGHT: 64 IN

## 2020-11-09 DIAGNOSIS — N89.8 VAGINAL DISCHARGE: ICD-10-CM

## 2020-11-09 DIAGNOSIS — Z20.2 POTENTIAL EXPOSURE TO STD: ICD-10-CM

## 2020-11-09 DIAGNOSIS — Z20.2 POTENTIAL EXPOSURE TO STD: Primary | ICD-10-CM

## 2020-11-09 PROCEDURE — 99999 PR PBB SHADOW E&M-EST. PATIENT-LVL II: ICD-10-PCS | Mod: PBBFAC,,, | Performed by: ADVANCED PRACTICE MIDWIFE

## 2020-11-09 PROCEDURE — 86696 HERPES SIMPLEX TYPE 2 TEST: CPT

## 2020-11-09 PROCEDURE — 99999 PR PBB SHADOW E&M-EST. PATIENT-LVL II: CPT | Mod: PBBFAC,,, | Performed by: ADVANCED PRACTICE MIDWIFE

## 2020-11-09 PROCEDURE — 86703 HIV-1/HIV-2 1 RESULT ANTBDY: CPT

## 2020-11-09 PROCEDURE — 86592 SYPHILIS TEST NON-TREP QUAL: CPT

## 2020-11-09 PROCEDURE — 99213 OFFICE O/P EST LOW 20 MIN: CPT | Mod: S$GLB,,, | Performed by: ADVANCED PRACTICE MIDWIFE

## 2020-11-09 PROCEDURE — 87480 CANDIDA DNA DIR PROBE: CPT

## 2020-11-09 PROCEDURE — 3008F BODY MASS INDEX DOCD: CPT | Mod: CPTII,S$GLB,, | Performed by: ADVANCED PRACTICE MIDWIFE

## 2020-11-09 PROCEDURE — 99213 PR OFFICE/OUTPT VISIT, EST, LEVL III, 20-29 MIN: ICD-10-PCS | Mod: S$GLB,,, | Performed by: ADVANCED PRACTICE MIDWIFE

## 2020-11-09 PROCEDURE — 87510 GARDNER VAG DNA DIR PROBE: CPT

## 2020-11-09 PROCEDURE — 3008F PR BODY MASS INDEX (BMI) DOCUMENTED: ICD-10-PCS | Mod: CPTII,S$GLB,, | Performed by: ADVANCED PRACTICE MIDWIFE

## 2020-11-09 PROCEDURE — 86694 HERPES SIMPLEX NES ANTBDY: CPT

## 2020-11-09 PROCEDURE — 36415 COLL VENOUS BLD VENIPUNCTURE: CPT

## 2020-11-09 PROCEDURE — 87340 HEPATITIS B SURFACE AG IA: CPT

## 2020-11-09 NOTE — PROGRESS NOTES
"Chichi Greer is a 25 y.o. female  p[facility] and desires STD testing. Pt reports partner with recent episode of testical pain and was told had HSV and is currently on suppressive therapy. Pt denies any outbreaks but deszires STD testing. Pt declines GC/chlam testing as partner was negative last week. Pt reports vaginal odor and irritation. Pt had pap done at outside clinic on 20 with LSIL, pos HPV- advised repeat pap in 1 year.        ROS:  GENERAL: No fever, chills, fatigability or weight loss.  VULVAR: No pain, no lesions and no itching.  VAGINAL: No relaxation, no abnormal bleeding and no lesions.REports discharge  ABDOMEN: No abdominal pain. Denies nausea. Denies vomiting. No diarrhea. No constipation  BREAST: Denies pain. No lumps. No discharge.  URINARY: No incontinence, no nocturia, no frequency and no dysuria.  CARDIOVASCULAR: No chest pain. No shortness of breath. No leg cramps.  NEUROLOGICAL: No headaches. No vision changes.      Review of patient's allergies indicates:  No Known Allergies    Current Outpatient Medications:     JUNEL FE 1/20, 28, 1 mg-20 mcg (21)/75 mg (7) per tablet, TK 1 T PO QD, Disp: , Rfl: 2    Past Medical History:   Diagnosis Date    Abnormal Pap smear of cervix      History reviewed. No pertinent surgical history.  Social History     Tobacco Use    Smoking status: Never Smoker    Smokeless tobacco: Never Used   Substance Use Topics    Alcohol use: Yes    Drug use: Never     OB History    Para Term  AB Living   0 0 0 0 0 0   SAB TAB Ectopic Multiple Live Births   0 0 0 0 0       Ht 5' 4.02" (1.626 m)   Wt 73 kg (160 lb 15 oz)   LMP 10/30/2020   BMI 27.61 kg/m²     PHYSICAL EXAM:  GENERAL: Calm and appropriate affect, alert, oriented x4  SKIN: Color appropriate for race, warm and dry, clean and intact with no rashes.  RESP: Even, unlabored breathing  ABDOMEN: Soft, nontender, no masses.  :   Normal external female genitalia " without lesions. Normal hair distribution. Adequate perineal body, normal urethral meatus.  Vagina pink and well rugated, no lesions, vaginal discharge - slight, no odor  No significant cystocele or rectocele.  Cervix pink without discharge or lesions, no cervical motion tenderness.  Uterus 4-6 weeks size, regular, mobile and nontender.  Adnexa: normal adnexa in size, nontender and no masses        ASSESSMENT / PLAN:    ICD-10-CM ICD-9-CM    1. Potential exposure to STD  Z20.2 V01.6 HIV 1/2 Ag/Ab (4th Gen)      Hepatitis B Surface Antigen      RPR      HSV 1 & 2, IgM      HSV 1 & 2, IgG   2. Vaginal discharge  N89.8 623.5 Vaginosis Screen by DNA Probe     Potential exposure to STD  -     HIV 1/2 Ag/Ab (4th Gen); Future; Expected date: 11/09/2020  -     Hepatitis B Surface Antigen; Future; Expected date: 11/09/2020  -     RPR; Future; Expected date: 11/09/2020  -     HSV 1 & 2, IgM; Future; Expected date: 11/09/2020  -     HSV 1 & 2, IgG; Future; Expected date: 11/09/2020    Vaginal discharge  -     Vaginosis Screen by DNA Probe      Lengthy discussion re HSV transmission, discussed outbreak vs hx exposure    Patient was counseled today on vaginitis prevention including :  a. avoiding feminine products such as deoderant soaps, body wash, bubble bath, douches, scented toilet paper, deoderant tampons or pads, feminine wipes, chronic pad use, etc.  b. avoiding other vulvovaginal irritants such as long hot baths, humidity, tight, synthetic clothing, chlorine and sitting around in wet bathing suits  c. wearing cotton underwear, avoiding thong underwear and no underwear to bed  d. taking showers instead of baths and use a hair dryer on cool setting afterwards to dry  e. wearing cotton to exercise and shower immediately after exercise and change clothes  f. using polyurethane condoms without spermicide if sexually active and symptoms are triggered by intercourse  g. Discussed use of vagisil, along with repHresh and  probiotics    FOLLOW UP:   Pending lab results, PRN lack of improvement.

## 2020-11-10 LAB
CANDIDA RRNA VAG QL PROBE: NEGATIVE
G VAGINALIS RRNA GENITAL QL PROBE: NEGATIVE
HIV 1+2 AB+HIV1 P24 AG SERPL QL IA: NEGATIVE
RPR SER QL: NORMAL
T VAGINALIS RRNA GENITAL QL PROBE: NEGATIVE

## 2020-11-11 LAB
HBV SURFACE AG SERPL QL IA: NEGATIVE
HSV1 IGG SERPL QL IA: NEGATIVE
HSV2 IGG SERPL QL IA: NEGATIVE

## 2020-11-13 LAB — HSV AB, IGM BY EIA: 0.25 INDEX

## 2021-07-14 ENCOUNTER — OFFICE VISIT (OUTPATIENT)
Dept: OBSTETRICS AND GYNECOLOGY | Facility: CLINIC | Age: 26
End: 2021-07-14
Attending: OBSTETRICS & GYNECOLOGY
Payer: COMMERCIAL

## 2021-07-14 VITALS
WEIGHT: 157.44 LBS | HEIGHT: 64 IN | SYSTOLIC BLOOD PRESSURE: 118 MMHG | DIASTOLIC BLOOD PRESSURE: 80 MMHG | BODY MASS INDEX: 26.88 KG/M2

## 2021-07-14 DIAGNOSIS — Z30.09 ENCOUNTER FOR OTHER GENERAL COUNSELING OR ADVICE ON CONTRACEPTION: ICD-10-CM

## 2021-07-14 DIAGNOSIS — Z01.419 WELL WOMAN EXAM: Primary | ICD-10-CM

## 2021-07-14 DIAGNOSIS — Z30.017 NEXPLANON INSERTION: ICD-10-CM

## 2021-07-14 DIAGNOSIS — N76.0 ACUTE VAGINITIS: ICD-10-CM

## 2021-07-14 PROCEDURE — 99999 PR PBB SHADOW E&M-EST. PATIENT-LVL III: CPT | Mod: PBBFAC,,, | Performed by: OBSTETRICS & GYNECOLOGY

## 2021-07-14 PROCEDURE — 1126F PR PAIN SEVERITY QUANTIFIED, NO PAIN PRESENT: ICD-10-PCS | Mod: S$GLB,,, | Performed by: OBSTETRICS & GYNECOLOGY

## 2021-07-14 PROCEDURE — 88175 CYTOPATH C/V AUTO FLUID REDO: CPT

## 2021-07-14 PROCEDURE — 99395 PREV VISIT EST AGE 18-39: CPT | Mod: S$GLB,,, | Performed by: OBSTETRICS & GYNECOLOGY

## 2021-07-14 PROCEDURE — 3008F BODY MASS INDEX DOCD: CPT | Mod: CPTII,S$GLB,, | Performed by: OBSTETRICS & GYNECOLOGY

## 2021-07-14 PROCEDURE — 1126F AMNT PAIN NOTED NONE PRSNT: CPT | Mod: S$GLB,,, | Performed by: OBSTETRICS & GYNECOLOGY

## 2021-07-14 PROCEDURE — 87591 N.GONORRHOEAE DNA AMP PROB: CPT

## 2021-07-14 PROCEDURE — 99999 PR PBB SHADOW E&M-EST. PATIENT-LVL III: ICD-10-PCS | Mod: PBBFAC,,, | Performed by: OBSTETRICS & GYNECOLOGY

## 2021-07-14 PROCEDURE — 3008F PR BODY MASS INDEX (BMI) DOCUMENTED: ICD-10-PCS | Mod: CPTII,S$GLB,, | Performed by: OBSTETRICS & GYNECOLOGY

## 2021-07-14 PROCEDURE — 87481 CANDIDA DNA AMP PROBE: CPT | Mod: 59

## 2021-07-14 PROCEDURE — 99395 PR PREVENTIVE VISIT,EST,18-39: ICD-10-PCS | Mod: S$GLB,,, | Performed by: OBSTETRICS & GYNECOLOGY

## 2021-07-14 PROCEDURE — 87491 CHLMYD TRACH DNA AMP PROBE: CPT

## 2021-07-15 ENCOUNTER — PATIENT MESSAGE (OUTPATIENT)
Dept: OBSTETRICS AND GYNECOLOGY | Facility: CLINIC | Age: 26
End: 2021-07-15

## 2021-07-15 LAB
BACTERIAL VAGINOSIS DNA: NEGATIVE
CANDIDA GLABRATA DNA: NEGATIVE
CANDIDA KRUSEI DNA: NEGATIVE
CANDIDA RRNA VAG QL PROBE: NEGATIVE
T VAGINALIS RRNA GENITAL QL PROBE: NEGATIVE

## 2021-07-20 LAB
C TRACH DNA SPEC QL NAA+PROBE: NOT DETECTED
FINAL PATHOLOGIC DIAGNOSIS: NORMAL
Lab: NORMAL
N GONORRHOEA DNA SPEC QL NAA+PROBE: NOT DETECTED

## 2021-07-30 ENCOUNTER — PATIENT MESSAGE (OUTPATIENT)
Dept: OBSTETRICS AND GYNECOLOGY | Facility: CLINIC | Age: 26
End: 2021-07-30

## 2021-08-03 ENCOUNTER — OFFICE VISIT (OUTPATIENT)
Dept: URGENT CARE | Facility: CLINIC | Age: 26
End: 2021-08-03
Payer: COMMERCIAL

## 2021-08-03 VITALS
SYSTOLIC BLOOD PRESSURE: 135 MMHG | BODY MASS INDEX: 26.8 KG/M2 | RESPIRATION RATE: 16 BRPM | WEIGHT: 157 LBS | DIASTOLIC BLOOD PRESSURE: 90 MMHG | TEMPERATURE: 98 F | HEIGHT: 64 IN | HEART RATE: 64 BPM | OXYGEN SATURATION: 98 %

## 2021-08-03 DIAGNOSIS — K12.1 MOUTH ULCERS: Primary | ICD-10-CM

## 2021-08-03 LAB
CTP QC/QA: YES
MOLECULAR STREP A: NEGATIVE

## 2021-08-03 PROCEDURE — 3080F DIAST BP >= 90 MM HG: CPT | Mod: CPTII,S$GLB,, | Performed by: NURSE PRACTITIONER

## 2021-08-03 PROCEDURE — 1160F PR REVIEW ALL MEDS BY PRESCRIBER/CLIN PHARMACIST DOCUMENTED: ICD-10-PCS | Mod: CPTII,S$GLB,, | Performed by: NURSE PRACTITIONER

## 2021-08-03 PROCEDURE — 99213 OFFICE O/P EST LOW 20 MIN: CPT | Mod: S$GLB,,, | Performed by: NURSE PRACTITIONER

## 2021-08-03 PROCEDURE — 3075F SYST BP GE 130 - 139MM HG: CPT | Mod: CPTII,S$GLB,, | Performed by: NURSE PRACTITIONER

## 2021-08-03 PROCEDURE — 3080F PR MOST RECENT DIASTOLIC BLOOD PRESSURE >= 90 MM HG: ICD-10-PCS | Mod: CPTII,S$GLB,, | Performed by: NURSE PRACTITIONER

## 2021-08-03 PROCEDURE — 1160F RVW MEDS BY RX/DR IN RCRD: CPT | Mod: CPTII,S$GLB,, | Performed by: NURSE PRACTITIONER

## 2021-08-03 PROCEDURE — 1159F MED LIST DOCD IN RCRD: CPT | Mod: CPTII,S$GLB,, | Performed by: NURSE PRACTITIONER

## 2021-08-03 PROCEDURE — 87651 POCT STREP A MOLECULAR: ICD-10-PCS | Mod: QW,S$GLB,, | Performed by: NURSE PRACTITIONER

## 2021-08-03 PROCEDURE — 87651 STREP A DNA AMP PROBE: CPT | Mod: QW,S$GLB,, | Performed by: NURSE PRACTITIONER

## 2021-08-03 PROCEDURE — 1159F PR MEDICATION LIST DOCUMENTED IN MEDICAL RECORD: ICD-10-PCS | Mod: CPTII,S$GLB,, | Performed by: NURSE PRACTITIONER

## 2021-08-03 PROCEDURE — 3008F PR BODY MASS INDEX (BMI) DOCUMENTED: ICD-10-PCS | Mod: CPTII,S$GLB,, | Performed by: NURSE PRACTITIONER

## 2021-08-03 PROCEDURE — 3008F BODY MASS INDEX DOCD: CPT | Mod: CPTII,S$GLB,, | Performed by: NURSE PRACTITIONER

## 2021-08-03 PROCEDURE — 3075F PR MOST RECENT SYSTOLIC BLOOD PRESS GE 130-139MM HG: ICD-10-PCS | Mod: CPTII,S$GLB,, | Performed by: NURSE PRACTITIONER

## 2021-08-03 PROCEDURE — 99213 PR OFFICE/OUTPT VISIT, EST, LEVL III, 20-29 MIN: ICD-10-PCS | Mod: S$GLB,,, | Performed by: NURSE PRACTITIONER

## 2021-09-01 ENCOUNTER — PATIENT MESSAGE (OUTPATIENT)
Dept: OBSTETRICS AND GYNECOLOGY | Facility: CLINIC | Age: 26
End: 2021-09-01

## 2021-09-02 RX ORDER — NORETHINDRONE ACETATE AND ETHINYL ESTRADIOL AND FERROUS FUMARATE 1MG-20(21)
KIT ORAL
Qty: 28 TABLET | Refills: 2 | Status: SHIPPED | OUTPATIENT
Start: 2021-09-02 | End: 2021-09-14

## 2021-09-14 ENCOUNTER — TELEPHONE (OUTPATIENT)
Dept: OBSTETRICS AND GYNECOLOGY | Facility: CLINIC | Age: 26
End: 2021-09-14

## 2021-09-14 RX ORDER — NORETHINDRONE ACETATE AND ETHINYL ESTRADIOL AND FERROUS FUMARATE 1MG-20(21)
KIT ORAL
Qty: 28 TABLET | Refills: 12 | Status: SHIPPED | OUTPATIENT
Start: 2021-09-14 | End: 2022-07-12 | Stop reason: CLARIF

## 2022-06-07 ENCOUNTER — OFFICE VISIT (OUTPATIENT)
Dept: SURGERY | Facility: CLINIC | Age: 27
End: 2022-06-07
Attending: SPECIALIST
Payer: COMMERCIAL

## 2022-06-07 ENCOUNTER — PATIENT MESSAGE (OUTPATIENT)
Dept: SURGERY | Facility: CLINIC | Age: 27
End: 2022-06-07

## 2022-06-07 VITALS
HEIGHT: 64 IN | OXYGEN SATURATION: 98 % | DIASTOLIC BLOOD PRESSURE: 76 MMHG | BODY MASS INDEX: 27.31 KG/M2 | WEIGHT: 160 LBS | HEART RATE: 56 BPM | SYSTOLIC BLOOD PRESSURE: 125 MMHG

## 2022-06-07 DIAGNOSIS — Z87.19 HISTORY OF UMBILICAL HERNIA: Primary | ICD-10-CM

## 2022-06-07 PROCEDURE — 3074F SYST BP LT 130 MM HG: CPT | Mod: CPTII,S$GLB,, | Performed by: SPECIALIST

## 2022-06-07 PROCEDURE — 3074F PR MOST RECENT SYSTOLIC BLOOD PRESSURE < 130 MM HG: ICD-10-PCS | Mod: CPTII,S$GLB,, | Performed by: SPECIALIST

## 2022-06-07 PROCEDURE — 1160F PR REVIEW ALL MEDS BY PRESCRIBER/CLIN PHARMACIST DOCUMENTED: ICD-10-PCS | Mod: CPTII,S$GLB,, | Performed by: SPECIALIST

## 2022-06-07 PROCEDURE — 1159F PR MEDICATION LIST DOCUMENTED IN MEDICAL RECORD: ICD-10-PCS | Mod: CPTII,S$GLB,, | Performed by: SPECIALIST

## 2022-06-07 PROCEDURE — 3008F PR BODY MASS INDEX (BMI) DOCUMENTED: ICD-10-PCS | Mod: CPTII,S$GLB,, | Performed by: SPECIALIST

## 2022-06-07 PROCEDURE — 99203 OFFICE O/P NEW LOW 30 MIN: CPT | Mod: S$GLB,,, | Performed by: SPECIALIST

## 2022-06-07 PROCEDURE — 3078F DIAST BP <80 MM HG: CPT | Mod: CPTII,S$GLB,, | Performed by: SPECIALIST

## 2022-06-07 PROCEDURE — 3078F PR MOST RECENT DIASTOLIC BLOOD PRESSURE < 80 MM HG: ICD-10-PCS | Mod: CPTII,S$GLB,, | Performed by: SPECIALIST

## 2022-06-07 PROCEDURE — 99999 PR PBB SHADOW E&M-EST. PATIENT-LVL III: ICD-10-PCS | Mod: PBBFAC,,, | Performed by: SPECIALIST

## 2022-06-07 PROCEDURE — 1160F RVW MEDS BY RX/DR IN RCRD: CPT | Mod: CPTII,S$GLB,, | Performed by: SPECIALIST

## 2022-06-07 PROCEDURE — 99999 PR PBB SHADOW E&M-EST. PATIENT-LVL III: CPT | Mod: PBBFAC,,, | Performed by: SPECIALIST

## 2022-06-07 PROCEDURE — 1159F MED LIST DOCD IN RCRD: CPT | Mod: CPTII,S$GLB,, | Performed by: SPECIALIST

## 2022-06-07 PROCEDURE — 99203 PR OFFICE/OUTPT VISIT, NEW, LEVL III, 30-44 MIN: ICD-10-PCS | Mod: S$GLB,,, | Performed by: SPECIALIST

## 2022-06-07 PROCEDURE — 3008F BODY MASS INDEX DOCD: CPT | Mod: CPTII,S$GLB,, | Performed by: SPECIALIST

## 2022-06-09 NOTE — PROGRESS NOTES
"History & Physical    SUBJECTIVE:     History of Present Illness:  Patient is a 26 y.o. female presents with symptomatic right inguinal hernia for repair.    Review of patient's allergies indicates:  No Known Allergies    Current Outpatient Medications   Medication Sig Dispense Refill    (Magic mouthwash) 1:1:1 Benadryl 12.5mg/5ml liq, aluminum & magnesium hydroxide-simehticone (Maalox), LIDOcaine viscous 2% Swish and spit 10 mLs every 4 (four) hours as needed (oral pain). 180 mL 0    JUNEL FE 1/20, 28, 1 mg-20 mcg (21)/75 mg (7) per tablet TK 1 T PO QD 28 tablet 12     No current facility-administered medications for this visit.       Past Medical History:   Diagnosis Date    Abnormal Pap smear of cervix      History reviewed. No pertinent surgical history.  Family History   Problem Relation Age of Onset    No Known Problems Mother     No Known Problems Father     Colon cancer Neg Hx     Eclampsia Neg Hx      Social History     Tobacco Use    Smoking status: Never Smoker    Smokeless tobacco: Never Used   Substance Use Topics    Alcohol use: Yes    Drug use: Never        Review of Systems:  Review of Systems   Constitutional: Negative.    HENT: Negative.    Eyes: Negative.    Respiratory: Negative.    Cardiovascular: Positive for palpitations. Negative for chest pain and leg swelling.   Gastrointestinal: Positive for abdominal distention and abdominal pain. Negative for constipation and diarrhea.   Endocrine: Negative.    Genitourinary: Negative.    Musculoskeletal: Negative.    Skin: Negative.    Allergic/Immunologic: Negative.    Hematological: Negative.    Psychiatric/Behavioral: Negative.        OBJECTIVE:     Vital Signs (Most Recent)  Pulse: (!) 56 (06/07/22 1510)  BP: 125/76 (06/07/22 1510)  SpO2: 98 % (06/07/22 1510)  5' 4" (1.626 m)  72.6 kg (160 lb)     Physical Exam:  Physical Exam  Constitutional:       Appearance: Normal appearance. She is normal weight.   HENT:      Head: Normocephalic and " atraumatic.   Eyes:      General: No scleral icterus.        Right eye: No discharge.         Left eye: No discharge.      Extraocular Movements: Extraocular movements intact.      Conjunctiva/sclera: Conjunctivae normal.   Cardiovascular:      Rate and Rhythm: Normal rate and regular rhythm.      Heart sounds: Normal heart sounds. No murmur heard.    No friction rub. No gallop.   Pulmonary:      Effort: Pulmonary effort is normal. No respiratory distress.      Breath sounds: Normal breath sounds. No wheezing or rales.   Abdominal:      General: Abdomen is flat. There is no distension.      Palpations: Abdomen is soft.      Tenderness: There is no abdominal tenderness. There is no guarding.      Hernia: A hernia is present.      Comments: Reducible right inguinal hernia   Musculoskeletal:         General: No swelling, tenderness or deformity. Normal range of motion.      Cervical back: Normal range of motion and neck supple. No rigidity or tenderness.   Skin:     General: Skin is warm and dry.      Coloration: Skin is not jaundiced.      Findings: No bruising or rash.   Neurological:      General: No focal deficit present.      Mental Status: She is alert and oriented to person, place, and time.      Motor: No weakness.      Coordination: Coordination normal.   Psychiatric:         Mood and Affect: Mood normal.         Behavior: Behavior normal.         Thought Content: Thought content normal.         Judgment: Judgment normal.             ASSESSMENT/PLAN:     Symptomatic right inguinal hernia/repair

## 2022-07-12 ENCOUNTER — HOSPITAL ENCOUNTER (OUTPATIENT)
Dept: PREADMISSION TESTING | Facility: OTHER | Age: 27
Discharge: HOME | End: 2022-07-12
Attending: SPECIALIST
Payer: COMMERCIAL

## 2022-07-12 ENCOUNTER — ANESTHESIA EVENT (OUTPATIENT)
Dept: SURGERY | Facility: OTHER | Age: 27
End: 2022-07-12
Payer: COMMERCIAL

## 2022-07-12 VITALS
TEMPERATURE: 98 F | HEIGHT: 64 IN | DIASTOLIC BLOOD PRESSURE: 68 MMHG | WEIGHT: 160 LBS | OXYGEN SATURATION: 99 % | BODY MASS INDEX: 27.31 KG/M2 | RESPIRATION RATE: 16 BRPM | HEART RATE: 60 BPM | SYSTOLIC BLOOD PRESSURE: 116 MMHG

## 2022-07-12 RX ORDER — SODIUM CHLORIDE, SODIUM LACTATE, POTASSIUM CHLORIDE, CALCIUM CHLORIDE 600; 310; 30; 20 MG/100ML; MG/100ML; MG/100ML; MG/100ML
INJECTION, SOLUTION INTRAVENOUS CONTINUOUS
Status: CANCELLED | OUTPATIENT
Start: 2022-07-12

## 2022-07-12 NOTE — ANESTHESIA PREPROCEDURE EVALUATION
07/12/2022  Chichi Grere is a 26 y.o., female.      Pre-op Assessment    I have reviewed the Patient Summary Reports.     I have reviewed the Nursing Notes. I have reviewed the NPO Status.   I have reviewed the Medications.     Review of Systems  Anesthesia Hx:  No problems with previous Anesthesia  Denies Family Hx of Anesthesia complications.   Denies Personal Hx of Anesthesia complications.   Social:  Social Alcohol Use THC occasional   Hematology/Oncology:  Hematology Normal   Oncology Normal     Cardiovascular:   Exercise tolerance: good    Pulmonary:  Pulmonary Normal    Renal/:  Renal/ Normal     Hepatic/GI:  Hepatic/GI Normal    Musculoskeletal:  Musculoskeletal Normal    Neurological:  Neurology Normal    Endocrine:  Endocrine Normal    Psych:  Psychiatric Normal           Physical Exam  General: Well nourished and Cooperative    Airway:  Mallampati: I   Mouth Opening: Normal  TM Distance: Normal  Neck ROM: Normal ROM    Dental:  Intact        Anesthesia Plan  Type of Anesthesia, risks & benefits discussed:    Anesthesia Type: Gen ETT  Intra-op Monitoring Plan: Standard ASA Monitors  Post Op Pain Control Plan: multimodal analgesia and peripheral nerve block  Induction:  IV  Airway Plan: Video  Informed Consent: Informed consent signed with the Patient and all parties understand the risks and agree with anesthesia plan.  All questions answered.   ASA Score: 1  Day of Surgery Review of History & Physical: H&P Update referred to the surgeon/provider.  Anesthesia Plan Notes: TAP block    Ready For Surgery From Anesthesia Perspective.     .

## 2022-07-12 NOTE — DISCHARGE INSTRUCTIONS
Information to Prepare you for your Surgery    PRE-ADMIT TESTING -  [phone]    [address] [facility]          Your surgery has been scheduled at [facility]. We are pleased to have the opportunity to serve you. For Further Information please call [phone].    On the day of surgery please report to the Information Desk on the 1st floor.    CONTACT YOUR PHYSICIAN'S OFFICE THE DAY PRIOR TO YOUR SURGERY TO OBTAIN YOUR ARRIVAL TIME.     The evening before surgery do not eat anything after 9 p.m. ( this includes hard candy, chewing gum and mints).  You may only have GATORADE, POWERADE AND WATER  from 9 p.m. until you leave your home.   DO NOT DRINK ANY LIQUIDS ON THE WAY TO THE HOSPITAL.      Why does your anesthesiologist allow you to drink Gatorade/Powerade before surgery?  Gatorade/Powerade helps to increase your comfort before surgery and to decrease your nausea after surgery. The carbohydrates in Gatorade/Powerade help reduce your body's stress response to surgery.  If you are a diabetic-drink only water prior to surgery.      Current Visitor policy(12/27/2021) - Patients may have 2 visitors pre and post procedure. Only 2 visitors will be allowed in the Surgical building with the patient.     SPECIAL MEDICATION INSTRUCTIONS: TAKE medications checked off by the Anesthesiologist on your Medication List.    Angiogram Patients: Take medications as instructed by your physician, including aspirin.     Surgery Patients:    If you take ASPIRIN - Your PHYSICIAN/SURGEON will need to inform you IF/OR when you need to stop taking aspirin prior to your surgery.     Do Not take any medications containing IBUPROFEN.    Do Not Wear any make-up (especially eye make-up) to surgery. Please remove any false eyelashes or eyelash extensions. If you arrive the day of surgery with makeup/eyelashes on you will be required to remove prior to surgery. (There is a risk of corneal  abrasions if eye makeup/eyelash extensions are not removed)      Leave all valuables at home.   Do Not wear any jewelry or watches, including any metal in body piercings. Jewelry must be removed prior to coming to the hospital.  There is a possibility that rings that are unable to be removed may be cut off if they are on the surgical extremity.    Please remove all hair extensions, wigs, clips and any other metal accessories/ ornaments from your hair.  These items may pose a flammable/fire risk in Surgery and must be removed.    Do not shave your surgical area at least 5 days prior to your surgery. The surgical prep will be performed at the hospital according to Infection Control regulations.    Contact Lens must be removed before surgery. Either do not wear the contact lens or bring a case and solution for storage.  Please bring a container for eyeglasses or dentures as required.  Bring any paperwork your physician has provided, such as consent forms,  history and physicals, doctor's orders, etc.   Bring comfortable clothes that are loose fitting to wear upon discharge. Take into consideration the type of surgery being performed.  Maintain your diet as advised per your physician the day prior to surgery.      Adequate rest the night before surgery is advised.   Park in the Parking lot behind the hospital or in the [employer] [facility] across the street from the parking lot. Parking is complimentary.  If you will be discharged the same day as your procedure, please arrange for a responsible adult to drive you home or to accompany you if traveling by taxi.   YOU WILL NOT BE PERMITTED TO DRIVE OR TO LEAVE THE HOSPITAL ALONE AFTER SURGERY.   If you are being discharged the same day, it is strongly recommended that you arrange for someone to remain with you for the first 24 hrs following your surgery.    The Surgeon will speak to your family/visitor after your surgery regarding the outcome of your surgery and post op  care.  The Surgeon may speak to you after your surgery, but there is a possibility you may not remember the details.  Please check with your family members regarding the conversation with the Surgeon.    We strongly recommend whoever is bringing you home be present for discharge instructions.  This will ensure a thorough understanding for your post op home care.    ALL CHILDREN MUST ALWAYS BE ACCOMPANIED BY AN ADULT.    Visitors-Refer to current Visitor policy handouts.    Thank you for your cooperation.  The Staff of [facility].            Bathing Instructions with Hibiclens    Shower the evening before and morning of your procedure with Hibiclens:  Wash your face with water and your regular face wash/soap  Apply Hibiclens directly on your skin or on a wet washcloth and wash gently. When showering: Move away from the shower stream when applying Hibiclens to avoid rinsing off too soon.  Rinse thoroughly with warm water  Do not dilute Hibiclens        Dry off as usual, do not use any deodorant, powder, body lotions, perfume, after shave or cologne.

## 2022-07-20 ENCOUNTER — ANESTHESIA (OUTPATIENT)
Dept: SURGERY | Facility: OTHER | Age: 27
End: 2022-07-20
Payer: COMMERCIAL

## 2022-07-20 ENCOUNTER — HOSPITAL ENCOUNTER (OUTPATIENT)
Facility: OTHER | Age: 27
Discharge: HOME | End: 2022-07-20
Attending: SPECIALIST | Admitting: SPECIALIST
Payer: COMMERCIAL

## 2022-07-20 VITALS
TEMPERATURE: 99 F | OXYGEN SATURATION: 98 % | WEIGHT: 160 LBS | HEART RATE: 68 BPM | SYSTOLIC BLOOD PRESSURE: 121 MMHG | HEIGHT: 64 IN | BODY MASS INDEX: 27.31 KG/M2 | DIASTOLIC BLOOD PRESSURE: 68 MMHG | RESPIRATION RATE: 16 BRPM

## 2022-07-20 DIAGNOSIS — K40.90 RIGHT INGUINAL HERNIA: Primary | ICD-10-CM

## 2022-07-20 LAB
B-HCG UR QL: NEGATIVE
CTP QC/QA: YES

## 2022-07-20 PROCEDURE — 37000008 HC ANESTHESIA 1ST 15 MINUTES: Performed by: SPECIALIST

## 2022-07-20 PROCEDURE — C1729 CATH, DRAINAGE: HCPCS | Performed by: SPECIALIST

## 2022-07-20 PROCEDURE — 64488 TAP BLOCK BI INJECTION: CPT | Performed by: ANESTHESIOLOGY

## 2022-07-20 PROCEDURE — 37000009 HC ANESTHESIA EA ADD 15 MINS: Performed by: SPECIALIST

## 2022-07-20 PROCEDURE — C9290 INJ, BUPIVACAINE LIPOSOME: HCPCS | Performed by: SPECIALIST

## 2022-07-20 PROCEDURE — 63600175 PHARM REV CODE 636 W HCPCS: Performed by: NURSE ANESTHETIST, CERTIFIED REGISTERED

## 2022-07-20 PROCEDURE — 63600175 PHARM REV CODE 636 W HCPCS: Performed by: SPECIALIST

## 2022-07-20 PROCEDURE — 63600175 PHARM REV CODE 636 W HCPCS: Performed by: ANESTHESIOLOGY

## 2022-07-20 PROCEDURE — 25000003 PHARM REV CODE 250: Performed by: SPECIALIST

## 2022-07-20 PROCEDURE — 71000033 HC RECOVERY, INTIAL HOUR: Performed by: SPECIALIST

## 2022-07-20 PROCEDURE — C1781 MESH (IMPLANTABLE): HCPCS | Performed by: SPECIALIST

## 2022-07-20 PROCEDURE — 63600175 PHARM REV CODE 636 W HCPCS

## 2022-07-20 PROCEDURE — 81025 URINE PREGNANCY TEST: CPT | Performed by: ANESTHESIOLOGY

## 2022-07-20 PROCEDURE — 25000003 PHARM REV CODE 250: Performed by: ANESTHESIOLOGY

## 2022-07-20 PROCEDURE — 71000016 HC POSTOP RECOV ADDL HR: Performed by: SPECIALIST

## 2022-07-20 PROCEDURE — 36000706: Performed by: SPECIALIST

## 2022-07-20 PROCEDURE — 27201423 OPTIME MED/SURG SUP & DEVICES STERILE SUPPLY: Performed by: SPECIALIST

## 2022-07-20 PROCEDURE — 25000003 PHARM REV CODE 250: Performed by: NURSE ANESTHETIST, CERTIFIED REGISTERED

## 2022-07-20 PROCEDURE — 49505 PR REPAIR ING HERNIA,5+Y/O,REDUCIBL: ICD-10-PCS | Mod: RT,,, | Performed by: SPECIALIST

## 2022-07-20 PROCEDURE — 36000707: Performed by: SPECIALIST

## 2022-07-20 PROCEDURE — 49505 PRP I/HERN INIT REDUC >5 YR: CPT | Mod: RT,,, | Performed by: SPECIALIST

## 2022-07-20 PROCEDURE — 71000015 HC POSTOP RECOV 1ST HR: Performed by: SPECIALIST

## 2022-07-20 DEVICE — MESH SOFT PROLENE WVN 3X6: Type: IMPLANTABLE DEVICE | Site: GROIN | Status: FUNCTIONAL

## 2022-07-20 RX ORDER — BUPIVACAINE HCL/EPINEPHRINE 0.25-.0005
VIAL (ML) INJECTION
Status: DISCONTINUED | OUTPATIENT
Start: 2022-07-20 | End: 2022-07-20 | Stop reason: HOSPADM

## 2022-07-20 RX ORDER — SODIUM CHLORIDE, SODIUM LACTATE, POTASSIUM CHLORIDE, CALCIUM CHLORIDE 600; 310; 30; 20 MG/100ML; MG/100ML; MG/100ML; MG/100ML
INJECTION, SOLUTION INTRAVENOUS CONTINUOUS
Status: DISCONTINUED | OUTPATIENT
Start: 2022-07-20 | End: 2022-07-20 | Stop reason: HOSPADM

## 2022-07-20 RX ORDER — MEPERIDINE HYDROCHLORIDE 25 MG/ML
12.5 INJECTION INTRAMUSCULAR; INTRAVENOUS; SUBCUTANEOUS ONCE AS NEEDED
Status: DISCONTINUED | OUTPATIENT
Start: 2022-07-20 | End: 2022-07-20 | Stop reason: HOSPADM

## 2022-07-20 RX ORDER — OXYCODONE HYDROCHLORIDE 5 MG/1
5 TABLET ORAL
Status: DISCONTINUED | OUTPATIENT
Start: 2022-07-20 | End: 2022-07-20 | Stop reason: HOSPADM

## 2022-07-20 RX ORDER — ACETAMINOPHEN 10 MG/ML
INJECTION, SOLUTION INTRAVENOUS
Status: DISCONTINUED | OUTPATIENT
Start: 2022-07-20 | End: 2022-07-20

## 2022-07-20 RX ORDER — OXYCODONE AND ACETAMINOPHEN 7.5; 325 MG/1; MG/1
1 TABLET ORAL EVERY 4 HOURS PRN
Qty: 20 TABLET | Refills: 0 | Status: SHIPPED | OUTPATIENT
Start: 2022-07-20 | End: 2023-07-19

## 2022-07-20 RX ORDER — SODIUM CHLORIDE 9 MG/ML
INJECTION, SOLUTION INTRAVENOUS CONTINUOUS
Status: DISCONTINUED | OUTPATIENT
Start: 2022-07-20 | End: 2022-07-20 | Stop reason: HOSPADM

## 2022-07-20 RX ORDER — DEXAMETHASONE SODIUM PHOSPHATE 4 MG/ML
INJECTION, SOLUTION INTRA-ARTICULAR; INTRALESIONAL; INTRAMUSCULAR; INTRAVENOUS; SOFT TISSUE
Status: DISCONTINUED | OUTPATIENT
Start: 2022-07-20 | End: 2022-07-20

## 2022-07-20 RX ORDER — PROCHLORPERAZINE EDISYLATE 5 MG/ML
5 INJECTION INTRAMUSCULAR; INTRAVENOUS EVERY 30 MIN PRN
Status: DISCONTINUED | OUTPATIENT
Start: 2022-07-20 | End: 2022-07-20 | Stop reason: HOSPADM

## 2022-07-20 RX ORDER — ONDANSETRON HYDROCHLORIDE 2 MG/ML
INJECTION, SOLUTION INTRAMUSCULAR; INTRAVENOUS
Status: DISCONTINUED | OUTPATIENT
Start: 2022-07-20 | End: 2022-07-20

## 2022-07-20 RX ORDER — MIDAZOLAM HYDROCHLORIDE 1 MG/ML
INJECTION INTRAMUSCULAR; INTRAVENOUS
Status: DISCONTINUED | OUTPATIENT
Start: 2022-07-20 | End: 2022-07-20

## 2022-07-20 RX ORDER — LIDOCAINE HCL/PF 100 MG/5ML
SYRINGE (ML) INTRAVENOUS
Status: DISCONTINUED | OUTPATIENT
Start: 2022-07-20 | End: 2022-07-20

## 2022-07-20 RX ORDER — CEFAZOLIN SODIUM 1 G/3ML
2 INJECTION, POWDER, FOR SOLUTION INTRAMUSCULAR; INTRAVENOUS
Status: COMPLETED | OUTPATIENT
Start: 2022-07-20 | End: 2022-07-20

## 2022-07-20 RX ORDER — FENTANYL CITRATE 50 UG/ML
INJECTION, SOLUTION INTRAMUSCULAR; INTRAVENOUS
Status: DISCONTINUED | OUTPATIENT
Start: 2022-07-20 | End: 2022-07-20

## 2022-07-20 RX ORDER — PROPOFOL 10 MG/ML
VIAL (ML) INTRAVENOUS
Status: DISCONTINUED | OUTPATIENT
Start: 2022-07-20 | End: 2022-07-20

## 2022-07-20 RX ORDER — DEXMEDETOMIDINE HYDROCHLORIDE 100 UG/ML
INJECTION, SOLUTION INTRAVENOUS
Status: DISCONTINUED | OUTPATIENT
Start: 2022-07-20 | End: 2022-07-20

## 2022-07-20 RX ORDER — KETOROLAC TROMETHAMINE 30 MG/ML
INJECTION, SOLUTION INTRAMUSCULAR; INTRAVENOUS
Status: DISCONTINUED | OUTPATIENT
Start: 2022-07-20 | End: 2022-07-20

## 2022-07-20 RX ORDER — LIDOCAINE HYDROCHLORIDE 10 MG/ML
1 INJECTION, SOLUTION EPIDURAL; INFILTRATION; INTRACAUDAL; PERINEURAL ONCE
Status: DISCONTINUED | OUTPATIENT
Start: 2022-07-20 | End: 2022-07-20 | Stop reason: HOSPADM

## 2022-07-20 RX ORDER — ROCURONIUM BROMIDE 10 MG/ML
INJECTION, SOLUTION INTRAVENOUS
Status: DISCONTINUED | OUTPATIENT
Start: 2022-07-20 | End: 2022-07-20

## 2022-07-20 RX ORDER — ROPIVACAINE HYDROCHLORIDE 5 MG/ML
INJECTION, SOLUTION EPIDURAL; INFILTRATION; PERINEURAL
Status: COMPLETED | OUTPATIENT
Start: 2022-07-20 | End: 2022-07-20

## 2022-07-20 RX ORDER — DIPHENHYDRAMINE HYDROCHLORIDE 50 MG/ML
25 INJECTION INTRAMUSCULAR; INTRAVENOUS EVERY 6 HOURS PRN
Status: DISCONTINUED | OUTPATIENT
Start: 2022-07-20 | End: 2022-07-20 | Stop reason: HOSPADM

## 2022-07-20 RX ORDER — HYDROMORPHONE HYDROCHLORIDE 2 MG/ML
0.4 INJECTION, SOLUTION INTRAMUSCULAR; INTRAVENOUS; SUBCUTANEOUS EVERY 5 MIN PRN
Status: DISCONTINUED | OUTPATIENT
Start: 2022-07-20 | End: 2022-07-20 | Stop reason: HOSPADM

## 2022-07-20 RX ORDER — SODIUM CHLORIDE 0.9 % (FLUSH) 0.9 %
3 SYRINGE (ML) INJECTION
Status: DISCONTINUED | OUTPATIENT
Start: 2022-07-20 | End: 2022-07-20 | Stop reason: HOSPADM

## 2022-07-20 RX ADMIN — PROPOFOL 200 MG: 10 INJECTION, EMULSION INTRAVENOUS at 07:07

## 2022-07-20 RX ADMIN — ROPIVACAINE HYDROCHLORIDE 30 ML: 5 INJECTION, SOLUTION EPIDURAL; INFILTRATION; PERINEURAL at 06:07

## 2022-07-20 RX ADMIN — DEXMEDETOMIDINE HYDROCHLORIDE 12 MCG: 100 INJECTION, SOLUTION, CONCENTRATE INTRAVENOUS at 07:07

## 2022-07-20 RX ADMIN — DIPHENHYDRAMINE HYDROCHLORIDE 25 MG: 50 INJECTION INTRAMUSCULAR; INTRAVENOUS at 11:07

## 2022-07-20 RX ADMIN — ACETAMINOPHEN 1000 MG: 10 INJECTION, SOLUTION INTRAVENOUS at 08:07

## 2022-07-20 RX ADMIN — GLYCOPYRROLATE 0.2 MG: 0.2 INJECTION, SOLUTION INTRAMUSCULAR; INTRAVITREAL at 07:07

## 2022-07-20 RX ADMIN — SUGAMMADEX 200 MG: 100 INJECTION, SOLUTION INTRAVENOUS at 08:07

## 2022-07-20 RX ADMIN — FENTANYL CITRATE 100 MCG: 50 INJECTION, SOLUTION INTRAMUSCULAR; INTRAVENOUS at 07:07

## 2022-07-20 RX ADMIN — ROCURONIUM BROMIDE 50 MG: 10 INJECTION, SOLUTION INTRAVENOUS at 07:07

## 2022-07-20 RX ADMIN — KETOROLAC TROMETHAMINE 60 MG: 30 INJECTION, SOLUTION INTRAMUSCULAR; INTRAVENOUS at 08:07

## 2022-07-20 RX ADMIN — CEFAZOLIN 2 G: 330 INJECTION, POWDER, FOR SOLUTION INTRAMUSCULAR; INTRAVENOUS at 07:07

## 2022-07-20 RX ADMIN — ONDANSETRON 4 MG: 2 INJECTION, SOLUTION INTRAMUSCULAR; INTRAVENOUS at 08:07

## 2022-07-20 RX ADMIN — CARBOXYMETHYLCELLULOSE SODIUM 2 DROP: 2.5 SOLUTION/ DROPS OPHTHALMIC at 07:07

## 2022-07-20 RX ADMIN — OXYCODONE 5 MG: 5 TABLET ORAL at 09:07

## 2022-07-20 RX ADMIN — DEXAMETHASONE SODIUM PHOSPHATE 8 MG: 4 INJECTION, SOLUTION INTRAMUSCULAR; INTRAVENOUS at 07:07

## 2022-07-20 RX ADMIN — LIDOCAINE HYDROCHLORIDE 100 MG: 20 INJECTION, SOLUTION INTRAVENOUS at 07:07

## 2022-07-20 RX ADMIN — MIDAZOLAM HYDROCHLORIDE 2 MG: 1 INJECTION, SOLUTION INTRAMUSCULAR; INTRAVENOUS at 06:07

## 2022-07-20 RX ADMIN — SODIUM CHLORIDE, SODIUM LACTATE, POTASSIUM CHLORIDE, AND CALCIUM CHLORIDE: 600; 310; 30; 20 INJECTION, SOLUTION INTRAVENOUS at 06:07

## 2022-07-20 NOTE — ANESTHESIA PROCEDURE NOTES
Intubation    Date/Time: 7/20/2022 7:23 AM  Performed by: Marissa Early CRNA  Authorized by: Sandre Christy MD     Intubation:     Induction:  Intravenous    Intubated:  Postinduction    Mask Ventilation:  Easy mask    Attempts:  1    Attempted By:  CRNA    Method of Intubation:  Video laryngoscopy    Blade:  Peguero 3    Laryngeal View Grade: Grade I - full view of cords      Difficult Airway Encountered?: No      Complications:  None    Airway Device:  Oral endotracheal tube    Airway Device Size:  7.0    Style/Cuff Inflation:  Cuffed    Inflation Amount (mL):  3    Tube secured:  21    Secured at:  The lips    Placement Verified By:  Capnometry    Complicating Factors:  None    Findings Post-Intubation:  BS equal bilateral

## 2022-07-20 NOTE — H&P
"History & Physical     SUBJECTIVE:      History of Present Illness:  Patient is a 26 y.o. female presents with symptomatic right inguinal hernia for repair.     Review of patient's allergies indicates:  No Known Allergies     Current Medications          Current Outpatient Medications   Medication Sig Dispense Refill    (Magic mouthwash) 1:1:1 Benadryl 12.5mg/5ml liq, aluminum & magnesium hydroxide-simehticone (Maalox), LIDOcaine viscous 2% Swish and spit 10 mLs every 4 (four) hours as needed (oral pain). 180 mL 0    JUNEL FE 1/20, 28, 1 mg-20 mcg (21)/75 mg (7) per tablet TK 1 T PO QD 28 tablet 12      No current facility-administered medications for this visit.                 Past Medical History:   Diagnosis Date    Abnormal Pap smear of cervix        History reviewed. No pertinent surgical history.        Family History   Problem Relation Age of Onset    No Known Problems Mother      No Known Problems Father      Colon cancer Neg Hx      Eclampsia Neg Hx        Social History           Tobacco Use    Smoking status: Never Smoker    Smokeless tobacco: Never Used   Substance Use Topics    Alcohol use: Yes    Drug use: Never         Review of Systems:  Review of Systems   Constitutional: Negative.    HENT: Negative.    Eyes: Negative.    Respiratory: Negative.    Cardiovascular: Positive for palpitations. Negative for chest pain and leg swelling.   Gastrointestinal: Positive for abdominal distention and abdominal pain. Negative for constipation and diarrhea.   Endocrine: Negative.    Genitourinary: Negative.    Musculoskeletal: Negative.    Skin: Negative.    Allergic/Immunologic: Negative.    Hematological: Negative.    Psychiatric/Behavioral: Negative.          OBJECTIVE:      Vital Signs (Most Recent)  Pulse: (!) 56 (06/07/22 1510)  BP: 125/76 (06/07/22 1510)  SpO2: 98 % (06/07/22 1510)  5' 4" (1.626 m)  72.6 kg (160 lb)      Physical Exam:  Physical Exam  Constitutional:       Appearance: Normal " appearance. She is normal weight.   HENT:      Head: Normocephalic and atraumatic.   Eyes:      General: No scleral icterus.        Right eye: No discharge.         Left eye: No discharge.      Extraocular Movements: Extraocular movements intact.      Conjunctiva/sclera: Conjunctivae normal.   Cardiovascular:      Rate and Rhythm: Normal rate and regular rhythm.      Heart sounds: Normal heart sounds. No murmur heard.    No friction rub. No gallop.   Pulmonary:      Effort: Pulmonary effort is normal. No respiratory distress.      Breath sounds: Normal breath sounds. No wheezing or rales.   Abdominal:      General: Abdomen is flat. There is no distension.      Palpations: Abdomen is soft.      Tenderness: There is no abdominal tenderness. There is no guarding.      Hernia: A hernia is present.      Comments: Reducible right inguinal hernia   Musculoskeletal:         General: No swelling, tenderness or deformity. Normal range of motion.      Cervical back: Normal range of motion and neck supple. No rigidity or tenderness.   Skin:     General: Skin is warm and dry.      Coloration: Skin is not jaundiced.      Findings: No bruising or rash.   Neurological:      General: No focal deficit present.      Mental Status: She is alert and oriented to person, place, and time.      Motor: No weakness.      Coordination: Coordination normal.   Psychiatric:         Mood and Affect: Mood normal.         Behavior: Behavior normal.         Thought Content: Thought content normal.         Judgment: Judgment normal.                  ASSESSMENT/PLAN:      Symptomatic right inguinal hernia/repair

## 2022-07-20 NOTE — TRANSFER OF CARE
"Anesthesia Transfer of Care Note    Patient: Chichi Greer    Procedure(s) Performed: Procedure(s) (LRB):  REPAIR, HERNIA, INGUINAL, WITHOUT HISTORY OF PRIOR REPAIR, AGE 7 YEARS OR OLDER (Right)    Patient location: PACU    Anesthesia Type: general    Transport from OR: Transported from OR on room air with adequate spontaneous ventilation    Post pain: adequate analgesia    Post assessment: no apparent anesthetic complications    Post vital signs: stable    Level of consciousness: awake    Nausea/Vomiting: no nausea/vomiting    Complications: none    Transfer of care protocol was followed      Last vitals:   Visit Vitals  /69 (BP Location: Right arm, Patient Position: Lying)   Pulse (!) 117   Temp 37 °C (98.6 °F) (Oral)   Resp 16   Ht 5' 4" (1.626 m)   Wt 72.6 kg (160 lb)   LMP 07/03/2022   SpO2 100%   Breastfeeding No   BMI 27.46 kg/m²     "

## 2022-07-20 NOTE — ANESTHESIA PROCEDURE NOTES
Bilateral TAP    Patient location during procedure: holding area   Block not for primary anesthetic.  Reason for block: at surgeon's request and post-op pain management   Post-op Pain Location: right inguinal   Timeout: 7/20/2022 6:41 AM   End time: 7/20/2022 6:47 AM    Staffing  Authorizing Provider: Sander Christy MD  Performing Provider: Sander Christy MD    Preanesthetic Checklist  Completed: patient identified, IV checked, site marked, risks and benefits discussed, surgical consent, monitors and equipment checked, pre-op evaluation and timeout performed  Peripheral Block  Patient position: supine  Prep: ChloraPrep and site prepped and draped  Patient monitoring: heart rate and continuous pulse ox  Block type: TAP  Laterality: bilateral  Injection technique: single shot  Needle  Needle type: Echogenic   Needle gauge: 21 G  Needle length: 4 in  Needle localization: anatomical landmarks and ultrasound guidance   -ultrasound image captured on disc.  Assessment  Injection assessment: negative aspiration, negative parasthesia and local visualized surrounding nerve  Paresthesia pain: none  Heart rate change: no  Slow fractionated injection: yes  Pain Tolerance: comfortable throughout block and no complaints  Medications:    Medications: ropivacaine (NAROPIN) injection 0.5% - Perineural   30 mL - 7/20/2022 6:46:00 AM    Additional Notes  Local visualized to spread between internal oblique and transversus abdominis

## 2022-07-20 NOTE — DISCHARGE INSTRUCTIONS
Your Surgeon Gave You EXPAREL® (bupivacaine liposome injectable suspension)    To help control your pain after surgery, your surgeon injected EXPAREL into your surgical incision just before the end of the procedure.   EXPAREL is a local analgesic that contains the local anesthetic bupivacaine. Local anesthetics provide pain relief by numbing the tissue  around the surgical site.   EXPAREL is specifically designed to release pain medication over time and can control pain for up to 72 hours.   In addition to EXPAREL, your surgeon may provide other pain medications to control your pain.   Each patient is different and responds differently to painmedication. Depending on how you respond to EXPAREL, you may require less  additional pain medication during your recovery.    Possible Side Effects    Side effects can occur with any medication and it is important not to ignore anything you may be experiencing. Some patients who  received EXPAREL experienced nausea, vomiting, or constipation. Rarely, patients who receive bupivacaine (the active ingredient in  EXPAREL) have experienced numbness and tingling in their mouth or lips, lightheadedness, or anxiety. Speak with your doctor right  away if you think you may be experiencing any of these sensations, or if you have other questions regarding possible side effects.    Your Recovery    When your pain is under control, your body can better focus on healing. This is not the time to test your pain tolerance, or grin and  bear it.Work with your surgeon and nurse to make your recovery as speedy and pain-free as possible.   Follow the post-op orders your nurse gave you.   Eat a healthy diet and drink plenty of water. Surgery stresses your body; your body responds by needing more energy to heal.      Important Information About EXPAREL  Products that contain bupivacaine, like EXPAREL, may cause a temporary loss of  sensation or the ability to move in the area where bupivacaine  was injected.    Date Administered:07-20-22  Time Administered: 0800    Other Forms of Bupivicaine should not be administered within 96hrs following administration of EXPAREL.

## 2022-07-20 NOTE — OR NURSING
Pt states pain [id number]. No change from previous assessment. Prepared for transfer to acu. Dr Pedersen at bedside.

## 2022-07-20 NOTE — PLAN OF CARE
Chichi Fuentes Premomid has met all discharge criteria from Phase II. Vital Signs are stable, ambulating  without difficulty.Pain is now under control and tolerable for the pt. Pain score 1 at this time.  Discharge instructions given, patient verbalized understanding. Discharged from facility via wheelchair in stable condition.

## 2022-07-20 NOTE — OP NOTE
[facility], [place], operated by [facility] ([facility])  Operative Note    SUMMARY     Surgery Date: 7/20/2022     Surgeon(s) and Role:     * Giovanni Pedersen Jr., MD - Primary    Assisting Surgeon: None    Pre-op Diagnosis:  Right inguinal hernia [K40.90]    Post-op Diagnosis:  Post-Op Diagnosis Codes:     * Right inguinal hernia [K40.90]    Procedure(s) (LRB):  REPAIR, HERNIA, INGUINAL, WITHOUT HISTORY OF PRIOR REPAIR, AGE 7 YEARS OR OLDER (Right)    Anesthesia: General    Description of Procedure:  The patient was brought to the operating room and placed in the supine position.  The abdomen prepped and draped in a sterile fashion.  A transverse incision made over the right nuchal canal.  Using sharp dissection the incision was carried down through the subcutaneous tissues and the fascia of the external oblique opened along the bias of the fibers.  The patient seen to have a direct hernia.  Palpation through the floor of the canal revealed no evidence of femoral hernia.  A piece of soft Prolene mesh was cut to an appropriate size and configuration and used to reconstruct the floor of the nuchal canal.  The nuchal ligament was transected and excised.  Hemostasis obtained with the LigaSure.  The mesh was secured to the ileopubic tract laterally and the conjoined tendon medially with 0 Ethibond suture.  After reconstruction the floor of the nuchal canal the external oblique was closed with interrupted 3-0 Vicryl, the subcutaneous tissue with interrupted 3-0 Vicryl, the skin with running 4-0 Monocryl.  The wounds then sterilely dressed.  The patient tolerated the procedure well left the operating room in good condition.  At the end of procedure all sponge lap instrument counts were correct.  Estimated Blood Loss:  Minimal         Specimens:   Specimen (24h ago, onward)            None              SUMMARY     Admit Date:     Discharge Date and Time:  07/20/2022 9:21 AM    Hospital Course (synopsis of major diagnoses, care, treatment, and services  provided during the course of the hospital stay):  Benign     Final Diagnosis: Post-Op Diagnosis Codes:     * Right inguinal hernia [K40.90]    Disposition: Home or Self Care    Follow Up/Patient Instructions:     Medications:  Reconciled Home Medications:      Medication List      START taking these medications    oxyCODONE-acetaminophen 7.5-325 mg per tablet  Commonly known as: PERCOCET  Take 1 tablet by mouth every 4 (four) hours as needed for Pain.          Discharge Procedure Orders   Diet general     Call MD for:  temperature >100.4     Call MD for:  persistent nausea and vomiting     Call MD for:  severe uncontrolled pain     Call MD for:  redness, tenderness, or signs of infection (pain, swelling, redness, odor or green/yellow discharge around incision site)     Ice to affected area     Lifting restrictions   Order Comments: 15 Lb's     Wound care routine (specify)   Order Comments: Wound care routine: leave Prineo intact  May shower  Ice packs

## 2022-07-20 NOTE — ANESTHESIA POSTPROCEDURE EVALUATION
Anesthesia Post Evaluation    Patient: Chichi Greer    Procedure(s) Performed: Procedure(s) (LRB):  REPAIR, HERNIA, INGUINAL, WITHOUT HISTORY OF PRIOR REPAIR, AGE 7 YEARS OR OLDER (Right)    Final Anesthesia Type: general      Patient location during evaluation: PACU  Patient participation: Yes- Able to Participate  Level of consciousness: awake and alert  Post-procedure vital signs: reviewed and stable  Pain management: adequate  Airway patency: patent    PONV status at discharge: No PONV  Anesthetic complications: no      Cardiovascular status: blood pressure returned to baseline  Respiratory status: unassisted and room air  Hydration status: euvolemic  Follow-up not needed.          Vitals Value Taken Time   /57 07/20/22 0926   Temp 37 °C (98.6 °F) 07/20/22 0925   Pulse 77 07/20/22 0930   Resp 16 07/20/22 0925   SpO2 99 % 07/20/22 0930   Vitals shown include unvalidated device data.      Event Time   Out of Recovery 09:33:48         Pain/April Score: Pain Rating Prior to Med Admin: 4 (7/20/2022  9:19 AM)  Pain Rating Post Med Admin: 4 (states tolerable) (7/20/2022  9:22 AM)  April Score: 9 (7/20/2022  9:22 AM)

## 2022-08-18 ENCOUNTER — OFFICE VISIT (OUTPATIENT)
Dept: SURGERY | Facility: CLINIC | Age: 27
End: 2022-08-18
Attending: SPECIALIST
Payer: COMMERCIAL

## 2022-08-18 VITALS — HEART RATE: 69 BPM | DIASTOLIC BLOOD PRESSURE: 76 MMHG | SYSTOLIC BLOOD PRESSURE: 122 MMHG | OXYGEN SATURATION: 98 %

## 2022-08-18 DIAGNOSIS — K40.30 UNILATERAL INGUINAL HERNIA WITH OBSTRUCTION AND WITHOUT GANGRENE, RECURRENCE NOT SPECIFIED: Primary | ICD-10-CM

## 2022-08-18 PROCEDURE — 1160F PR REVIEW ALL MEDS BY PRESCRIBER/CLIN PHARMACIST DOCUMENTED: ICD-10-PCS | Mod: CPTII,S$GLB,, | Performed by: SPECIALIST

## 2022-08-18 PROCEDURE — 3074F PR MOST RECENT SYSTOLIC BLOOD PRESSURE < 130 MM HG: ICD-10-PCS | Mod: CPTII,S$GLB,, | Performed by: SPECIALIST

## 2022-08-18 PROCEDURE — 3078F DIAST BP <80 MM HG: CPT | Mod: CPTII,S$GLB,, | Performed by: SPECIALIST

## 2022-08-18 PROCEDURE — 3074F SYST BP LT 130 MM HG: CPT | Mod: CPTII,S$GLB,, | Performed by: SPECIALIST

## 2022-08-18 PROCEDURE — 1159F PR MEDICATION LIST DOCUMENTED IN MEDICAL RECORD: ICD-10-PCS | Mod: CPTII,S$GLB,, | Performed by: SPECIALIST

## 2022-08-18 PROCEDURE — 1159F MED LIST DOCD IN RCRD: CPT | Mod: CPTII,S$GLB,, | Performed by: SPECIALIST

## 2022-08-18 PROCEDURE — 1160F RVW MEDS BY RX/DR IN RCRD: CPT | Mod: CPTII,S$GLB,, | Performed by: SPECIALIST

## 2022-08-18 PROCEDURE — 99024 POSTOP FOLLOW-UP VISIT: CPT | Mod: S$GLB,,, | Performed by: SPECIALIST

## 2022-08-18 PROCEDURE — 99999 PR PBB SHADOW E&M-EST. PATIENT-LVL III: ICD-10-PCS | Mod: PBBFAC,,, | Performed by: SPECIALIST

## 2022-08-18 PROCEDURE — 3078F PR MOST RECENT DIASTOLIC BLOOD PRESSURE < 80 MM HG: ICD-10-PCS | Mod: CPTII,S$GLB,, | Performed by: SPECIALIST

## 2022-08-18 PROCEDURE — 99024 PR POST-OP FOLLOW-UP VISIT: ICD-10-PCS | Mod: S$GLB,,, | Performed by: SPECIALIST

## 2022-08-18 PROCEDURE — 99999 PR PBB SHADOW E&M-EST. PATIENT-LVL III: CPT | Mod: PBBFAC,,, | Performed by: SPECIALIST

## 2022-08-18 NOTE — PROGRESS NOTES
Status post open mesh repair right inguinal hernia 07/20/2022  Some residual bruising and discomfort with movement     PE  Incision healing without evidence of infection     Impression/plan surgically stable   RTC 6 weeks

## 2022-09-07 ENCOUNTER — PATIENT MESSAGE (OUTPATIENT)
Dept: SURGERY | Facility: CLINIC | Age: 27
End: 2022-09-07
Payer: COMMERCIAL

## 2022-09-29 ENCOUNTER — OFFICE VISIT (OUTPATIENT)
Dept: SURGERY | Facility: CLINIC | Age: 27
End: 2022-09-29
Attending: SPECIALIST
Payer: COMMERCIAL

## 2022-09-29 VITALS — DIASTOLIC BLOOD PRESSURE: 71 MMHG | OXYGEN SATURATION: 98 % | HEART RATE: 60 BPM | SYSTOLIC BLOOD PRESSURE: 127 MMHG

## 2022-09-29 DIAGNOSIS — K40.90 RIGHT INGUINAL HERNIA: Primary | ICD-10-CM

## 2022-09-29 PROCEDURE — 1159F MED LIST DOCD IN RCRD: CPT | Mod: CPTII,S$GLB,, | Performed by: SPECIALIST

## 2022-09-29 PROCEDURE — 3078F DIAST BP <80 MM HG: CPT | Mod: CPTII,S$GLB,, | Performed by: SPECIALIST

## 2022-09-29 PROCEDURE — 99024 POSTOP FOLLOW-UP VISIT: CPT | Mod: S$GLB,,, | Performed by: SPECIALIST

## 2022-09-29 PROCEDURE — 99999 PR PBB SHADOW E&M-EST. PATIENT-LVL III: ICD-10-PCS | Mod: PBBFAC,,, | Performed by: SPECIALIST

## 2022-09-29 PROCEDURE — 3074F SYST BP LT 130 MM HG: CPT | Mod: CPTII,S$GLB,, | Performed by: SPECIALIST

## 2022-09-29 PROCEDURE — 99024 PR POST-OP FOLLOW-UP VISIT: ICD-10-PCS | Mod: S$GLB,,, | Performed by: SPECIALIST

## 2022-09-29 PROCEDURE — 1160F RVW MEDS BY RX/DR IN RCRD: CPT | Mod: CPTII,S$GLB,, | Performed by: SPECIALIST

## 2022-09-29 PROCEDURE — 3078F PR MOST RECENT DIASTOLIC BLOOD PRESSURE < 80 MM HG: ICD-10-PCS | Mod: CPTII,S$GLB,, | Performed by: SPECIALIST

## 2022-09-29 PROCEDURE — 1160F PR REVIEW ALL MEDS BY PRESCRIBER/CLIN PHARMACIST DOCUMENTED: ICD-10-PCS | Mod: CPTII,S$GLB,, | Performed by: SPECIALIST

## 2022-09-29 PROCEDURE — 3074F PR MOST RECENT SYSTOLIC BLOOD PRESSURE < 130 MM HG: ICD-10-PCS | Mod: CPTII,S$GLB,, | Performed by: SPECIALIST

## 2022-09-29 PROCEDURE — 99999 PR PBB SHADOW E&M-EST. PATIENT-LVL III: CPT | Mod: PBBFAC,,, | Performed by: SPECIALIST

## 2022-09-29 PROCEDURE — 1159F PR MEDICATION LIST DOCUMENTED IN MEDICAL RECORD: ICD-10-PCS | Mod: CPTII,S$GLB,, | Performed by: SPECIALIST

## 2022-09-29 NOTE — PROGRESS NOTES
Status post open mesh repair right inguinal hernia 07/20/2022   Patient with some paresthesias but no significant pain     PE  Abdomen-soft, wounds healed, no evidence of infection or recurrence     Impression/plan  Surgically stable   RTC p.r.n.

## 2023-01-30 ENCOUNTER — PATIENT MESSAGE (OUTPATIENT)
Dept: SURGERY | Facility: CLINIC | Age: 28
End: 2023-01-30
Payer: COMMERCIAL

## 2023-01-30 DIAGNOSIS — R10.32 LLQ PAIN: Primary | ICD-10-CM

## 2023-02-01 ENCOUNTER — HOSPITAL ENCOUNTER (OUTPATIENT)
Dept: RADIOLOGY | Facility: OTHER | Age: 28
Discharge: HOME | End: 2023-02-01
Attending: SPECIALIST
Payer: COMMERCIAL

## 2023-02-01 DIAGNOSIS — R10.32 LLQ PAIN: ICD-10-CM

## 2023-02-01 PROCEDURE — 76705 ECHO EXAM OF ABDOMEN: CPT | Mod: 26,,, | Performed by: RADIOLOGY

## 2023-02-01 PROCEDURE — 76705 US ABDOMEN LIMITED: ICD-10-PCS | Mod: 26,,, | Performed by: RADIOLOGY

## 2023-02-01 PROCEDURE — 76705 ECHO EXAM OF ABDOMEN: CPT | Mod: TC

## 2023-07-19 ENCOUNTER — OFFICE VISIT (OUTPATIENT)
Dept: OBSTETRICS AND GYNECOLOGY | Facility: CLINIC | Age: 28
End: 2023-07-19
Payer: COMMERCIAL

## 2023-07-19 VITALS
DIASTOLIC BLOOD PRESSURE: 66 MMHG | BODY MASS INDEX: 27.82 KG/M2 | HEIGHT: 64 IN | WEIGHT: 162.94 LBS | SYSTOLIC BLOOD PRESSURE: 104 MMHG

## 2023-07-19 DIAGNOSIS — Z01.419 WELL WOMAN EXAM WITH ROUTINE GYNECOLOGICAL EXAM: Primary | ICD-10-CM

## 2023-07-19 DIAGNOSIS — N76.0 VAGINITIS AND VULVOVAGINITIS: ICD-10-CM

## 2023-07-19 DIAGNOSIS — Z30.09 ENCOUNTER FOR GENERAL COUNSELING AND ADVICE ON CONTRACEPTIVE MANAGEMENT: ICD-10-CM

## 2023-07-19 PROCEDURE — 99395 PR PREVENTIVE VISIT,EST,18-39: ICD-10-PCS | Mod: S$GLB,,, | Performed by: STUDENT IN AN ORGANIZED HEALTH CARE EDUCATION/TRAINING PROGRAM

## 2023-07-19 PROCEDURE — 3074F PR MOST RECENT SYSTOLIC BLOOD PRESSURE < 130 MM HG: ICD-10-PCS | Mod: CPTII,S$GLB,, | Performed by: STUDENT IN AN ORGANIZED HEALTH CARE EDUCATION/TRAINING PROGRAM

## 2023-07-19 PROCEDURE — 3078F PR MOST RECENT DIASTOLIC BLOOD PRESSURE < 80 MM HG: ICD-10-PCS | Mod: CPTII,S$GLB,, | Performed by: STUDENT IN AN ORGANIZED HEALTH CARE EDUCATION/TRAINING PROGRAM

## 2023-07-19 PROCEDURE — 3078F DIAST BP <80 MM HG: CPT | Mod: CPTII,S$GLB,, | Performed by: STUDENT IN AN ORGANIZED HEALTH CARE EDUCATION/TRAINING PROGRAM

## 2023-07-19 PROCEDURE — 87591 N.GONORRHOEAE DNA AMP PROB: CPT | Performed by: STUDENT IN AN ORGANIZED HEALTH CARE EDUCATION/TRAINING PROGRAM

## 2023-07-19 PROCEDURE — 1159F MED LIST DOCD IN RCRD: CPT | Mod: CPTII,S$GLB,, | Performed by: STUDENT IN AN ORGANIZED HEALTH CARE EDUCATION/TRAINING PROGRAM

## 2023-07-19 PROCEDURE — 3074F SYST BP LT 130 MM HG: CPT | Mod: CPTII,S$GLB,, | Performed by: STUDENT IN AN ORGANIZED HEALTH CARE EDUCATION/TRAINING PROGRAM

## 2023-07-19 PROCEDURE — 99395 PREV VISIT EST AGE 18-39: CPT | Mod: S$GLB,,, | Performed by: STUDENT IN AN ORGANIZED HEALTH CARE EDUCATION/TRAINING PROGRAM

## 2023-07-19 PROCEDURE — 3008F PR BODY MASS INDEX (BMI) DOCUMENTED: ICD-10-PCS | Mod: CPTII,S$GLB,, | Performed by: STUDENT IN AN ORGANIZED HEALTH CARE EDUCATION/TRAINING PROGRAM

## 2023-07-19 PROCEDURE — 81514 NFCT DS BV&VAGINITIS DNA ALG: CPT | Performed by: STUDENT IN AN ORGANIZED HEALTH CARE EDUCATION/TRAINING PROGRAM

## 2023-07-19 PROCEDURE — 3008F BODY MASS INDEX DOCD: CPT | Mod: CPTII,S$GLB,, | Performed by: STUDENT IN AN ORGANIZED HEALTH CARE EDUCATION/TRAINING PROGRAM

## 2023-07-19 PROCEDURE — 1159F PR MEDICATION LIST DOCUMENTED IN MEDICAL RECORD: ICD-10-PCS | Mod: CPTII,S$GLB,, | Performed by: STUDENT IN AN ORGANIZED HEALTH CARE EDUCATION/TRAINING PROGRAM

## 2023-07-19 NOTE — PROGRESS NOTES
History & Physical  Gynecology      SUBJECTIVE:     Chief Complaint: Well Woman       History of Present Illness:  Annual. Recent yeast infection from antibiotics. Feels it may be coming back  Menstrual History: OCP since 20. Stopped last year as she was not sexually active. Since may having painful menses, diarrhea. Then had cycle one week earlier than usual. Before these past few, they are light and normal  Obstetric Hx: 0  LMP:   STD/STI Hx: HPV  Contraception Hx: inconsistent condom use.  Sexually Active: 3 male partners in last twelve months  Family history: Denies any personal or family history of GYN/colon cancers   Social: Wears seatbelts. Exercises. Feels safe at home.   Last pap:  normal  HPV vaccine: 1/3  Covid vaccine     Review of patient's allergies indicates:  No Known Allergies    Past Medical History:   Diagnosis Date    Abnormal Pap smear of cervix     Palpitations      Past Surgical History:   Procedure Laterality Date    MOUTH SURGERY      REPAIR, HERNIA, INGUINAL, WITHOUT HISTORY OF PRIOR REPAIR, AGE 7 YEARS OR OLDER Right 2022    Procedure: REPAIR, HERNIA, INGUINAL, WITHOUT HISTORY OF PRIOR REPAIR, AGE 7 YEARS OR OLDER;  Surgeon: Giovanni Pedersen Jr., MD;  Location: [facility];  Service: General;  Laterality: Right;     OB History          0    Para   0    Term   0       0    AB   0    Living   0         SAB   0    IAB   0    Ectopic   0    Multiple   0    Live Births   0               Family History   Problem Relation Age of Onset    No Known Problems Mother     No Known Problems Father     Colon cancer Neg Hx     Eclampsia Neg Hx      Social History     Tobacco Use    Smoking status: Never    Smokeless tobacco: Never   Substance Use Topics    Alcohol use: Yes     Comment: social    Drug use: Never       No current outpatient medications on file.     No current facility-administered medications for this visit.         Review of Systems:  Review of Systems    Constitutional:  Negative for activity change, appetite change, fever and unexpected weight change.   Respiratory:  Negative for shortness of breath.    Cardiovascular:  Negative for chest pain.   Gastrointestinal:  Negative for abdominal pain, blood in stool, constipation, diarrhea, nausea and vomiting.   Genitourinary:  Positive for menstrual problem. Negative for dysmenorrhea, dyspareunia, dysuria, hematuria, menorrhagia, pelvic pain, vaginal bleeding, vaginal discharge, vaginal pain, postcoital bleeding and vaginal odor.   Integumentary:  Negative for breast mass.   Breast: Negative for lump and mass     OBJECTIVE:     Physical Exam:  Physical Exam  Vitals reviewed.   Constitutional:       General: She is not in acute distress.     Appearance: She is well-developed. She is not diaphoretic.   HENT:      Head: Normocephalic and atraumatic.   Eyes:      General: No scleral icterus.        Right eye: No discharge.         Left eye: No discharge.      Conjunctiva/sclera: Conjunctivae normal.   Neck:      Thyroid: No thyromegaly.   Cardiovascular:      Rate and Rhythm: Normal rate.   Pulmonary:      Effort: Pulmonary effort is normal.   Chest:   Breasts:     Breasts are symmetrical.      Right: No inverted nipple, mass, nipple discharge, skin change or tenderness.      Left: No inverted nipple, mass, nipple discharge, skin change or tenderness.   Abdominal:      General: There is no distension.      Palpations: Abdomen is soft.      Tenderness: There is no abdominal tenderness.   Genitourinary:     Labia:         Right: No rash, tenderness, lesion or injury.         Left: No rash, tenderness, lesion or injury.       Vagina: Normal. No signs of injury and foreign body. No vaginal discharge, erythema, tenderness or bleeding.      Cervix: No cervical motion tenderness, discharge or friability.      Uterus: Not deviated, not enlarged, not fixed and not tender.       Adnexa:         Right: No mass, tenderness or  fullness.          Left: No mass, tenderness or fullness.     Musculoskeletal:         General: Normal range of motion.      Cervical back: Normal range of motion and neck supple.   Lymphadenopathy:      Cervical: No cervical adenopathy.   Skin:     General: Skin is warm and dry.      Findings: No erythema or rash.   Neurological:      Mental Status: She is alert and oriented to person, place, and time.       ASSESSMENT:       ICD-10-CM ICD-9-CM    1. Well woman exam with routine gynecological exam  Z01.419 V72.31       2. Encounter for general counseling and advice on contraceptive management  Z30.09 V25.09              Plan:      WWE  - Vaccines utd  - Pap due 2024  - Mammogram age 42  - GC/CT, affirm collected  - Daily vitamin discussed.  - Consistent condom use discussed.   - CBE normal. Physical exam normal. VSS.  - RTC for annual or PRN.    contraception  PMH, medications reviewed. Patient was counseled today on contraceptivel options--Pills, Patch, Depo-Provera, IUD, Nuva Ring, Nexplanon and Mirena/Sklya/Paragard, Phexxi and the pros and cons of each and advatantages of condoms to prevent STDs.  The patient elected to use the mirena.   [id number] placed.        Counseling time: 30 minutes    Belle Markham

## 2023-07-20 ENCOUNTER — PATIENT MESSAGE (OUTPATIENT)
Dept: OBSTETRICS AND GYNECOLOGY | Facility: CLINIC | Age: 28
End: 2023-07-20
Payer: COMMERCIAL

## 2023-07-20 LAB
BACTERIAL VAGINOSIS DNA: NEGATIVE
C TRACH DNA SPEC QL NAA+PROBE: NOT DETECTED
CANDIDA GLABRATA DNA: NEGATIVE
CANDIDA KRUSEI DNA: NEGATIVE
CANDIDA RRNA VAG QL PROBE: POSITIVE
N GONORRHOEA DNA SPEC QL NAA+PROBE: NOT DETECTED
T VAGINALIS RRNA GENITAL QL PROBE: NEGATIVE

## 2023-07-21 RX ORDER — FLUCONAZOLE 150 MG/1
150 TABLET ORAL
Qty: 3 TABLET | Refills: 0 | Status: SHIPPED | OUTPATIENT
Start: 2023-07-21 | End: 2023-07-28

## 2023-07-31 ENCOUNTER — PATIENT MESSAGE (OUTPATIENT)
Dept: SURGERY | Facility: CLINIC | Age: 28
End: 2023-07-31
Payer: COMMERCIAL

## 2023-08-02 ENCOUNTER — OFFICE VISIT (OUTPATIENT)
Dept: SURGERY | Facility: CLINIC | Age: 28
End: 2023-08-02
Attending: SPECIALIST
Payer: COMMERCIAL

## 2023-08-02 VITALS
WEIGHT: 160 LBS | BODY MASS INDEX: 26.66 KG/M2 | DIASTOLIC BLOOD PRESSURE: 82 MMHG | HEIGHT: 65 IN | OXYGEN SATURATION: 98 % | SYSTOLIC BLOOD PRESSURE: 152 MMHG | HEART RATE: 66 BPM

## 2023-08-02 DIAGNOSIS — R10.32 LEFT LOWER QUADRANT PAIN: Primary | ICD-10-CM

## 2023-08-02 DIAGNOSIS — R10.32 LLQ PAIN: ICD-10-CM

## 2023-08-02 PROCEDURE — 3077F PR MOST RECENT SYSTOLIC BLOOD PRESSURE >= 140 MM HG: ICD-10-PCS | Mod: CPTII,S$GLB,, | Performed by: SPECIALIST

## 2023-08-02 PROCEDURE — 99212 PR OFFICE/OUTPT VISIT, EST, LEVL II, 10-19 MIN: ICD-10-PCS | Mod: S$GLB,,, | Performed by: SPECIALIST

## 2023-08-02 PROCEDURE — 3077F SYST BP >= 140 MM HG: CPT | Mod: CPTII,S$GLB,, | Performed by: SPECIALIST

## 2023-08-02 PROCEDURE — 3079F DIAST BP 80-89 MM HG: CPT | Mod: CPTII,S$GLB,, | Performed by: SPECIALIST

## 2023-08-02 PROCEDURE — 99999 PR PBB SHADOW E&M-EST. PATIENT-LVL III: CPT | Mod: PBBFAC,,, | Performed by: SPECIALIST

## 2023-08-02 PROCEDURE — 99212 OFFICE O/P EST SF 10 MIN: CPT | Mod: S$GLB,,, | Performed by: SPECIALIST

## 2023-08-02 PROCEDURE — 3008F PR BODY MASS INDEX (BMI) DOCUMENTED: ICD-10-PCS | Mod: CPTII,S$GLB,, | Performed by: SPECIALIST

## 2023-08-02 PROCEDURE — 1159F PR MEDICATION LIST DOCUMENTED IN MEDICAL RECORD: ICD-10-PCS | Mod: CPTII,S$GLB,, | Performed by: SPECIALIST

## 2023-08-02 PROCEDURE — 3008F BODY MASS INDEX DOCD: CPT | Mod: CPTII,S$GLB,, | Performed by: SPECIALIST

## 2023-08-02 PROCEDURE — 1160F PR REVIEW ALL MEDS BY PRESCRIBER/CLIN PHARMACIST DOCUMENTED: ICD-10-PCS | Mod: CPTII,S$GLB,, | Performed by: SPECIALIST

## 2023-08-02 PROCEDURE — 99999 PR PBB SHADOW E&M-EST. PATIENT-LVL III: ICD-10-PCS | Mod: PBBFAC,,, | Performed by: SPECIALIST

## 2023-08-02 PROCEDURE — 1160F RVW MEDS BY RX/DR IN RCRD: CPT | Mod: CPTII,S$GLB,, | Performed by: SPECIALIST

## 2023-08-02 PROCEDURE — 3079F PR MOST RECENT DIASTOLIC BLOOD PRESSURE 80-89 MM HG: ICD-10-PCS | Mod: CPTII,S$GLB,, | Performed by: SPECIALIST

## 2023-08-02 PROCEDURE — 1159F MED LIST DOCD IN RCRD: CPT | Mod: CPTII,S$GLB,, | Performed by: SPECIALIST

## 2023-08-02 NOTE — PROGRESS NOTES
27-year-old female with history of increasing pain left groin.  Pain with exercise and straining  Ultrasound of bilateral groins in February of this year showed no evidence of hernia    PE   Tender to palpation left inguinal region      Plan

## 2023-08-05 ENCOUNTER — HOSPITAL ENCOUNTER (OUTPATIENT)
Dept: RADIOLOGY | Facility: OTHER | Age: 28
Discharge: HOME | End: 2023-08-05
Attending: SPECIALIST
Payer: COMMERCIAL

## 2023-08-05 DIAGNOSIS — R10.32 LEFT LOWER QUADRANT PAIN: ICD-10-CM

## 2023-08-05 PROCEDURE — 76705 ECHO EXAM OF ABDOMEN: CPT | Mod: TC

## 2023-08-05 PROCEDURE — 76705 US ABDOMEN LIMITED: ICD-10-PCS | Mod: 26,,, | Performed by: RADIOLOGY

## 2023-08-05 PROCEDURE — 76705 ECHO EXAM OF ABDOMEN: CPT | Mod: 26,,, | Performed by: RADIOLOGY

## 2023-08-07 ENCOUNTER — PROCEDURE VISIT (OUTPATIENT)
Dept: OBSTETRICS AND GYNECOLOGY | Facility: CLINIC | Age: 28
End: 2023-08-07
Payer: COMMERCIAL

## 2023-08-07 VITALS
SYSTOLIC BLOOD PRESSURE: 112 MMHG | BODY MASS INDEX: 27.74 KG/M2 | DIASTOLIC BLOOD PRESSURE: 72 MMHG | HEIGHT: 64 IN | WEIGHT: 162.5 LBS

## 2023-08-07 DIAGNOSIS — Z30.430 ENCOUNTER FOR IUD INSERTION: Primary | ICD-10-CM

## 2023-08-07 PROCEDURE — 58300 INSERTION OF IUD: ICD-10-PCS | Mod: S$GLB,,, | Performed by: STUDENT IN AN ORGANIZED HEALTH CARE EDUCATION/TRAINING PROGRAM

## 2023-08-07 PROCEDURE — 99499 NO LOS: ICD-10-PCS | Mod: S$GLB,,, | Performed by: STUDENT IN AN ORGANIZED HEALTH CARE EDUCATION/TRAINING PROGRAM

## 2023-08-07 PROCEDURE — 99499 UNLISTED E&M SERVICE: CPT | Mod: S$GLB,,, | Performed by: STUDENT IN AN ORGANIZED HEALTH CARE EDUCATION/TRAINING PROGRAM

## 2023-08-07 PROCEDURE — 58300 INSERT INTRAUTERINE DEVICE: CPT | Mod: S$GLB,,, | Performed by: STUDENT IN AN ORGANIZED HEALTH CARE EDUCATION/TRAINING PROGRAM

## 2023-08-07 RX ORDER — IBUPROFEN 800 MG/1
800 TABLET ORAL EVERY 8 HOURS
COMMUNITY
Start: 2023-04-11

## 2023-08-07 NOTE — PROCEDURES
Insertion of IUD    Date/Time: 8/7/2023 1:00 PM    Performed by: Belle Markham MD  Authorized by: Belle Markham MD    Consent:     Consent obtained:  Written    Consent given by:  Patient    Procedure risks and benefits discussed: yes      Patient questions answered: yes      Patient agrees, verbalizes understanding, and wants to proceed: yes      Educational handouts given: yes      Instructions and paperwork completed: yes    Procedure:     Negative urine pregnancy test: yes      Cervix cleaned and prepped: yes      Speculum placed in vagina: yes      Tenaculum applied to cervix: yes      Uterus sounded: yes      Uterus sound depth (cm):  6    IUD inserted with no complications: yes      Strings trimmed: yes    1 Intra Uterine Device levonorgestreL 21 mcg/24 hours (8 yrs) 52 mg     Post-procedure:     Patient tolerated procedure well: yes    Comments:      [id number]  Exp 9/1/25

## 2023-08-09 ENCOUNTER — TELEPHONE (OUTPATIENT)
Dept: SURGERY | Facility: CLINIC | Age: 28
End: 2023-08-09
Payer: COMMERCIAL

## 2023-08-11 ENCOUNTER — PATIENT MESSAGE (OUTPATIENT)
Dept: SURGERY | Facility: CLINIC | Age: 28
End: 2023-08-11
Payer: COMMERCIAL

## 2023-08-11 ENCOUNTER — PATIENT MESSAGE (OUTPATIENT)
Dept: OBSTETRICS AND GYNECOLOGY | Facility: CLINIC | Age: 28
End: 2023-08-11
Payer: COMMERCIAL

## 2024-08-15 ENCOUNTER — OFFICE VISIT (OUTPATIENT)
Dept: OBSTETRICS AND GYNECOLOGY | Facility: CLINIC | Age: 29
End: 2024-08-15
Payer: COMMERCIAL

## 2024-08-15 VITALS
SYSTOLIC BLOOD PRESSURE: 109 MMHG | HEIGHT: 64 IN | DIASTOLIC BLOOD PRESSURE: 82 MMHG | BODY MASS INDEX: 29.74 KG/M2 | WEIGHT: 174.19 LBS

## 2024-08-15 DIAGNOSIS — Z01.419 WELL WOMAN EXAM WITH ROUTINE GYNECOLOGICAL EXAM: Primary | ICD-10-CM

## 2024-08-15 PROCEDURE — 87491 CHLMYD TRACH DNA AMP PROBE: CPT | Performed by: STUDENT IN AN ORGANIZED HEALTH CARE EDUCATION/TRAINING PROGRAM

## 2024-08-15 PROCEDURE — 3079F DIAST BP 80-89 MM HG: CPT | Mod: CPTII,S$GLB,, | Performed by: STUDENT IN AN ORGANIZED HEALTH CARE EDUCATION/TRAINING PROGRAM

## 2024-08-15 PROCEDURE — 3008F BODY MASS INDEX DOCD: CPT | Mod: CPTII,S$GLB,, | Performed by: STUDENT IN AN ORGANIZED HEALTH CARE EDUCATION/TRAINING PROGRAM

## 2024-08-15 PROCEDURE — 87591 N.GONORRHOEAE DNA AMP PROB: CPT | Performed by: STUDENT IN AN ORGANIZED HEALTH CARE EDUCATION/TRAINING PROGRAM

## 2024-08-15 PROCEDURE — 99999 PR PBB SHADOW E&M-EST. PATIENT-LVL III: CPT | Mod: PBBFAC,,, | Performed by: STUDENT IN AN ORGANIZED HEALTH CARE EDUCATION/TRAINING PROGRAM

## 2024-08-15 PROCEDURE — 99395 PREV VISIT EST AGE 18-39: CPT | Mod: S$GLB,,, | Performed by: STUDENT IN AN ORGANIZED HEALTH CARE EDUCATION/TRAINING PROGRAM

## 2024-08-15 PROCEDURE — 3074F SYST BP LT 130 MM HG: CPT | Mod: CPTII,S$GLB,, | Performed by: STUDENT IN AN ORGANIZED HEALTH CARE EDUCATION/TRAINING PROGRAM

## 2024-08-15 PROCEDURE — 1159F MED LIST DOCD IN RCRD: CPT | Mod: CPTII,S$GLB,, | Performed by: STUDENT IN AN ORGANIZED HEALTH CARE EDUCATION/TRAINING PROGRAM

## 2024-08-15 NOTE — PROGRESS NOTES
History & Physical  Gynecology      SUBJECTIVE:     Chief Complaint: Well Woman       History of Present Illness:  Annual. Feels she is gaining weight. Had episode of cramping last night with twinge in the IUD place   Menstrual History: no menses on iud  Obstetric Hx: 0  LMP: n/a  STD/STI Hx: HPV  Contraception Hx: mirena   Sexually Active: 1 male partner in last twelve months  Family history: Denies any personal or family history of GYN/colon cancers   Social: Wears seatbelts. Exercises. Feels safe at home.   Last pap:  normal  HPV vaccine: 1/3    Review of patient's allergies indicates:  No Known Allergies    Past Medical History:   Diagnosis Date    Abnormal Pap smear of cervix     Palpitations      Past Surgical History:   Procedure Laterality Date    MOUTH SURGERY      REPAIR, HERNIA, INGUINAL, WITHOUT HISTORY OF PRIOR REPAIR, AGE 7 YEARS OR OLDER Right 2022    Procedure: REPAIR, HERNIA, INGUINAL, WITHOUT HISTORY OF PRIOR REPAIR, AGE 7 YEARS OR OLDER;  Surgeon: Giovanni Pedersen Jr., MD;  Location: [facility];  Service: General;  Laterality: Right;     OB History          0    Para   0    Term   0       0    AB   0    Living   0         SAB   0    IAB   0    Ectopic   0    Multiple   0    Live Births   0               Family History   Problem Relation Name Age of Onset    No Known Problems Mother      No Known Problems Father      Colon cancer Neg Hx      Eclampsia Neg Hx       Social History     Tobacco Use    Smoking status: Never    Smokeless tobacco: Never   Substance Use Topics    Alcohol use: Yes     Comment: social    Drug use: Never       Current Outpatient Medications   Medication Sig    ibuprofen (ADVIL,MOTRIN) 800 MG tablet Take 800 mg by mouth every 8 (eight) hours.     Current Facility-Administered Medications   Medication    levonorgestreL (MIRENA) 21 mcg/24 hours (8 yrs) 52 mg IUD 1 Intra Uterine Device         Review of Systems:  Review of Systems    Constitutional:  Positive for unexpected weight change. Negative for activity change, appetite change and fever.   Respiratory:  Negative for shortness of breath.    Cardiovascular:  Negative for chest pain.   Gastrointestinal:  Positive for abdominal pain. Negative for blood in stool, constipation, diarrhea, nausea and vomiting.   Genitourinary:  Negative for dysmenorrhea, dyspareunia, dysuria, hematuria, menorrhagia, menstrual problem, pelvic pain, vaginal bleeding, vaginal discharge, vaginal pain, postcoital bleeding and vaginal odor.   Integumentary:  Negative for breast mass.   Breast: Negative for lump and mass     OBJECTIVE:     Physical Exam:  Physical Exam  Vitals reviewed.   Constitutional:       General: She is not in acute distress.     Appearance: She is well-developed. She is not diaphoretic.   HENT:      Head: Normocephalic and atraumatic.   Eyes:      General: No scleral icterus.        Right eye: No discharge.         Left eye: No discharge.      Conjunctiva/sclera: Conjunctivae normal.   Neck:      Thyroid: No thyromegaly.   Cardiovascular:      Rate and Rhythm: Normal rate.   Pulmonary:      Effort: Pulmonary effort is normal.   Chest:   Breasts:     Breasts are symmetrical.      Right: No inverted nipple, mass, nipple discharge, skin change or tenderness.      Left: No inverted nipple, mass, nipple discharge, skin change or tenderness.   Abdominal:      General: There is no distension.      Palpations: Abdomen is soft.      Tenderness: There is no abdominal tenderness.   Genitourinary:     Labia:         Right: No rash, tenderness, lesion or injury.         Left: No rash, tenderness, lesion or injury.       Vagina: Normal. No signs of injury and foreign body. No vaginal discharge, erythema, tenderness or bleeding.      Cervix: No cervical motion tenderness, discharge or friability.      Uterus: Not deviated, not enlarged, not fixed and not tender.       Adnexa:         Right: No mass,  tenderness or fullness.          Left: No mass, tenderness or fullness.        Comments: IUD strings noted  Musculoskeletal:         General: Normal range of motion.      Cervical back: Normal range of motion and neck supple.   Lymphadenopathy:      Cervical: No cervical adenopathy.   Skin:     General: Skin is warm and dry.      Findings: No erythema or rash.   Neurological:      Mental Status: She is alert and oriented to person, place, and time.       ASSESSMENT:     No diagnosis found.         Plan:      WWE  - Vaccines utd  - Pap due 2024  - Mammogram age 42  - GC/CT, affirm collected  - Daily vitamin discussed.  - Consistent condom use discussed.   - CBE normal. Physical exam normal. VSS.  - RTC for annual or PRN.    contraception  PMH, medications reviewed. Patient was counseled today on contraceptivel options--Pills, Patch, Depo-Provera, IUD, Nuva Ring, Nexplanon and Mirena/Sklya/Paragard, Phexxi and the pros and cons of each and advatantages of condoms to prevent STDs.  The patient elected to use the mirena.   [id number] placed.        Counseling time: 30 minutes    Belle Markham

## 2024-08-16 LAB
C TRACH DNA SPEC QL NAA+PROBE: NOT DETECTED
N GONORRHOEA DNA SPEC QL NAA+PROBE: NOT DETECTED

## 2024-08-26 NOTE — PROGRESS NOTES
[facility] Note    Chief Complaint      Chief Complaint   Patient presents with    [facility]    Annual Exam       History of Present Illness      Chichi Greer is a 28 y.o. female who presents today for   Chief Complaint   Patient presents with    [facility]    Annual Exam         Patient is new to me.  She presents to clinic today establish primary care with me and for her routine medical exam.  She reports feeling well today.  She will be changing insurances to a PPO beginning September 1.  She was concerned about possible weight gain but realized this could be due to weightlifting training.  There are no complaints at this time.         Review of Systems   All 12 systems otherwise negative.       Family History:  family history includes Anxiety disorder in her sister; Diabetes in her paternal grandfather; Heart attacks under age 50 in her mother; Leukemia in her maternal grandfather; No Known Problems in her brother and paternal grandmother; Obesity in her father and mother; Stroke in her maternal grandmother.   Family history was reviewed with patient.     Medications:  No outpatient encounter medications on file as of 8/29/2024.     Facility-Administered Encounter Medications as of 8/29/2024   Medication Dose Route Frequency Provider Last Rate Last Admin    levonorgestreL (MIRENA) 21 mcg/24 hours (8 yrs) 52 mg IUD 1 Intra Uterine Device  1 Intra Uterine Device Intrauterine  Al[place], Belle ZAMARRIPA MD   1 Intra Uterine Device at 08/07/23 1300       Allergies:  Review of patient's allergies indicates:  No Known Allergies    Health Maintenance:  Health Maintenance   Topic Date Due    Hepatitis C Screening  Never done    Lipid Panel  Never done    HPV Vaccines (2 - 2-dose series) 01/21/2012    TETANUS VACCINE  09/03/2019    Pap Smear  08/15/2027     Health Maintenance Topics with due status: Not Due       Topic Last Completion Date    Influenza Vaccine 11/06/2013    Pap Smear 08/15/2024  "      Physical Exam      Vital Signs  Pulse: (!) 55  SpO2: 97 %  BP: 122/78  BP Location: Right arm  Patient Position: Sitting  Pain Score: 0-No pain  Height and Weight  Height: 5' 4" (162.6 cm)  Weight: 79 kg (174 lb 2.6 oz)  BSA (Calculated - sq m): 1.89 sq meters  BMI (Calculated): 29.9  Weight in (lb) to have BMI = 25: 145.3]    Physical Exam  Vitals reviewed.   Constitutional:       Appearance: Normal appearance. She is normal weight.   HENT:      Head: Normocephalic and atraumatic.      Right Ear: Tympanic membrane, ear canal and external ear normal.      Left Ear: Tympanic membrane, ear canal and external ear normal.      Nose: Nose normal.      Mouth/Throat:      Mouth: Mucous membranes are moist.      Pharynx: Oropharynx is clear.   Eyes:      Extraocular Movements: Extraocular movements intact.      Conjunctiva/sclera: Conjunctivae normal.      Pupils: Pupils are equal, round, and reactive to light.   Cardiovascular:      Rate and Rhythm: Normal rate and regular rhythm.      Pulses: Normal pulses.      Heart sounds: Normal heart sounds.   Pulmonary:      Effort: Pulmonary effort is normal.      Breath sounds: Normal breath sounds.   Abdominal:      General: Abdomen is flat. Bowel sounds are normal.      Palpations: Abdomen is soft.   Musculoskeletal:         General: Normal range of motion.      Cervical back: Normal range of motion and neck supple.   Skin:     General: Skin is warm and dry.      Capillary Refill: Capillary refill takes less than 2 seconds.   Neurological:      General: No focal deficit present.      Mental Status: She is alert and oriented to person, place, and time. Mental status is at baseline.   Psychiatric:         Mood and Affect: Mood normal.         Behavior: Behavior normal.         Thought Content: Thought content normal.         Judgment: Judgment normal.            Assessment/Plan     Chichi Greer is a 28 y.o.female with:    Routine medical exam  -     Hepatitis C " Antibody; Future; Expected date: 08/29/2024    Encounter for hepatitis C screening test for low risk patient  -     CBC Auto Differential; Future; Expected date: 08/29/2024  -     Comprehensive Metabolic Panel; Future; Expected date: 08/29/2024  -     Hemoglobin A1C; Future; Expected date: 08/29/2024  -     Lipid Panel; Future; Expected date: 08/29/2024  -     T4, Free; Future; Expected date: 08/29/2024  -     TSH; Future; Expected date: 08/29/2024        As above, continue current medications and maintain follow up with specialists.  Return to clinic as needed.    Greater than 50% of visit was spent face to face with patient.  All questions were answered to patient's satisfaction.          Karen L Spencer, NP-C [facility]

## 2024-08-29 ENCOUNTER — OFFICE VISIT (OUTPATIENT)
Dept: PRIMARY CARE CLINIC | Facility: CLINIC | Age: 29
End: 2024-08-29
Payer: COMMERCIAL

## 2024-08-29 VITALS
BODY MASS INDEX: 29.74 KG/M2 | WEIGHT: 174.19 LBS | SYSTOLIC BLOOD PRESSURE: 122 MMHG | OXYGEN SATURATION: 97 % | DIASTOLIC BLOOD PRESSURE: 78 MMHG | HEIGHT: 64 IN | HEART RATE: 55 BPM

## 2024-08-29 DIAGNOSIS — Z00.00 ROUTINE MEDICAL EXAM: Primary | ICD-10-CM

## 2024-08-29 DIAGNOSIS — Z11.59 ENCOUNTER FOR HEPATITIS C SCREENING TEST FOR LOW RISK PATIENT: ICD-10-CM

## 2024-08-29 PROCEDURE — 1159F MED LIST DOCD IN RCRD: CPT | Mod: CPTII,S$GLB,, | Performed by: NURSE PRACTITIONER

## 2024-08-29 PROCEDURE — 3074F SYST BP LT 130 MM HG: CPT | Mod: CPTII,S$GLB,, | Performed by: NURSE PRACTITIONER

## 2024-08-29 PROCEDURE — 3078F DIAST BP <80 MM HG: CPT | Mod: CPTII,S$GLB,, | Performed by: NURSE PRACTITIONER

## 2024-08-29 PROCEDURE — 99999 PR PBB SHADOW E&M-EST. PATIENT-LVL III: CPT | Mod: PBBFAC,,, | Performed by: NURSE PRACTITIONER

## 2024-08-29 PROCEDURE — 1160F RVW MEDS BY RX/DR IN RCRD: CPT | Mod: CPTII,S$GLB,, | Performed by: NURSE PRACTITIONER

## 2024-08-29 PROCEDURE — 3008F BODY MASS INDEX DOCD: CPT | Mod: CPTII,S$GLB,, | Performed by: NURSE PRACTITIONER

## 2024-08-29 PROCEDURE — 99385 PREV VISIT NEW AGE 18-39: CPT | Mod: S$GLB,,, | Performed by: NURSE PRACTITIONER

## 2024-08-30 ENCOUNTER — LAB VISIT (OUTPATIENT)
Dept: LAB | Facility: HOSPITAL | Age: 29
End: 2024-08-30
Attending: NURSE PRACTITIONER
Payer: COMMERCIAL

## 2024-08-30 DIAGNOSIS — Z00.00 ROUTINE MEDICAL EXAM: ICD-10-CM

## 2024-08-30 DIAGNOSIS — Z11.59 ENCOUNTER FOR HEPATITIS C SCREENING TEST FOR LOW RISK PATIENT: ICD-10-CM

## 2024-08-30 LAB
ALBUMIN SERPL BCP-MCNC: 4.5 G/DL (ref 3.5–5.2)
ALP SERPL-CCNC: 37 U/L (ref 55–135)
ALT SERPL W/O P-5'-P-CCNC: 16 U/L (ref 10–44)
ANION GAP SERPL CALC-SCNC: 10 MMOL/L (ref 8–16)
AST SERPL-CCNC: 19 U/L (ref 10–40)
BASOPHILS # BLD AUTO: 0.05 K/UL (ref 0–0.2)
BASOPHILS NFR BLD: 0.6 % (ref 0–1.9)
BILIRUB SERPL-MCNC: 1.1 MG/DL (ref 0.1–1)
BUN SERPL-MCNC: 17 MG/DL (ref 6–20)
CALCIUM SERPL-MCNC: 9.5 MG/DL (ref 8.7–10.5)
CHLORIDE SERPL-SCNC: 106 MMOL/L (ref 95–110)
CHOLEST SERPL-MCNC: 162 MG/DL (ref 120–199)
CHOLEST/HDLC SERPL: 4.1 {RATIO} (ref 2–5)
CO2 SERPL-SCNC: 22 MMOL/L (ref 23–29)
CREAT SERPL-MCNC: 0.9 MG/DL (ref 0.5–1.4)
DIFFERENTIAL METHOD BLD: NORMAL
EOSINOPHIL # BLD AUTO: 0.1 K/UL (ref 0–0.5)
EOSINOPHIL NFR BLD: 1.1 % (ref 0–8)
ERYTHROCYTE [DISTWIDTH] IN BLOOD BY AUTOMATED COUNT: 12.4 % (ref 11.5–14.5)
EST. GFR  (NO RACE VARIABLE): >60 ML/MIN/1.73 M^2
ESTIMATED AVG GLUCOSE: 88 MG/DL (ref 68–131)
GLUCOSE SERPL-MCNC: 93 MG/DL (ref 70–110)
HBA1C MFR BLD: 4.7 % (ref 4–5.6)
HCT VFR BLD AUTO: 42.6 % (ref 37–48.5)
HCV AB SERPL QL IA: NORMAL
HDLC SERPL-MCNC: 40 MG/DL (ref 40–75)
HDLC SERPL: 24.7 % (ref 20–50)
HGB BLD-MCNC: 13.8 G/DL (ref 12–16)
IMM GRANULOCYTES # BLD AUTO: 0.02 K/UL (ref 0–0.04)
IMM GRANULOCYTES NFR BLD AUTO: 0.2 % (ref 0–0.5)
LDLC SERPL CALC-MCNC: 101.4 MG/DL (ref 63–159)
LYMPHOCYTES # BLD AUTO: 2.3 K/UL (ref 1–4.8)
LYMPHOCYTES NFR BLD: 27.4 % (ref 18–48)
MCH RBC QN AUTO: 30.1 PG (ref 27–31)
MCHC RBC AUTO-ENTMCNC: 32.4 G/DL (ref 32–36)
MCV RBC AUTO: 93 FL (ref 82–98)
MONOCYTES # BLD AUTO: 0.6 K/UL (ref 0.3–1)
MONOCYTES NFR BLD: 7.2 % (ref 4–15)
NEUTROPHILS # BLD AUTO: 5.3 K/UL (ref 1.8–7.7)
NEUTROPHILS NFR BLD: 63.5 % (ref 38–73)
NONHDLC SERPL-MCNC: 122 MG/DL
NRBC BLD-RTO: 0 /100 WBC
PLATELET # BLD AUTO: 226 K/UL (ref 150–450)
PMV BLD AUTO: 12 FL (ref 9.2–12.9)
POTASSIUM SERPL-SCNC: 4.5 MMOL/L (ref 3.5–5.1)
PROT SERPL-MCNC: 7.3 G/DL (ref 6–8.4)
RBC # BLD AUTO: 4.59 M/UL (ref 4–5.4)
SODIUM SERPL-SCNC: 138 MMOL/L (ref 136–145)
T4 FREE SERPL-MCNC: 0.9 NG/DL (ref 0.71–1.51)
TRIGL SERPL-MCNC: 103 MG/DL (ref 30–150)
TSH SERPL DL<=0.005 MIU/L-ACNC: 1.95 UIU/ML (ref 0.4–4)
WBC # BLD AUTO: 8.37 K/UL (ref 3.9–12.7)

## 2024-08-30 PROCEDURE — 80053 COMPREHEN METABOLIC PANEL: CPT | Performed by: NURSE PRACTITIONER

## 2024-08-30 PROCEDURE — 83036 HEMOGLOBIN GLYCOSYLATED A1C: CPT | Performed by: NURSE PRACTITIONER

## 2024-08-30 PROCEDURE — 84443 ASSAY THYROID STIM HORMONE: CPT | Performed by: NURSE PRACTITIONER

## 2024-08-30 PROCEDURE — 85025 COMPLETE CBC W/AUTO DIFF WBC: CPT | Performed by: NURSE PRACTITIONER

## 2024-08-30 PROCEDURE — 86803 HEPATITIS C AB TEST: CPT | Performed by: NURSE PRACTITIONER

## 2024-08-30 PROCEDURE — 84439 ASSAY OF FREE THYROXINE: CPT | Performed by: NURSE PRACTITIONER

## 2024-08-30 PROCEDURE — 80061 LIPID PANEL: CPT | Performed by: NURSE PRACTITIONER

## 2024-11-11 ENCOUNTER — PATIENT MESSAGE (OUTPATIENT)
Dept: PRIMARY CARE CLINIC | Facility: CLINIC | Age: 29
End: 2024-11-11
Payer: COMMERCIAL

## 2024-11-11 ENCOUNTER — NURSE TRIAGE (OUTPATIENT)
Dept: ADMINISTRATIVE | Facility: CLINIC | Age: 29
End: 2024-11-11
Payer: COMMERCIAL

## 2024-11-11 NOTE — TELEPHONE ENCOUNTER
OOC RN Patient calling c/o  heart palpations going on 3-4 weeks ago for over a year ago,  History of papillations.this time going on 3 days.  See a doctor in 3 days.  [place] asking for cardiology   ??????????    For any new or worsening symptoms  to call back..  Reason for Disposition   Palpitations and no improvement after following Care Advice    Additional Information   Negative: Passed out (e.g., fainted, lost consciousness, blacked out and was not responding)   Negative: Shock suspected (e.g., cold/pale/clammy skin, too weak to stand, low BP, rapid pulse)   Negative: Difficult to awaken or acting confused (e.g., disoriented, slurred speech)   Negative: Visible sweat on face or sweat dripping down face   Negative: Unable to walk, or can only walk with assistance (e.g., requires support)   Negative: Received SHOCK from implantable cardiac defibrillator and has persisting symptoms (i.e., palpitations, lightheadedness)   Negative: Feeling weak or lightheaded (e.g., woozy, feeling like they might faint) AND heart beating very rapidly (e.g., > 140 / minute)   Negative: Feeling weak or lightheaded (e.g., woozy, feeling like they might faint) AND heart beating very slowly (e.g., < 50 / minute)   Negative: Sounds like a life-threatening emergency to the triager   Negative: Difficulty breathing   Negative: Feeling weak or lightheaded (e.g., woozy, feeling like they might faint)   Negative: Heart beating very rapidly (e.g., > 140 / minute) and present now  (Exception: During exercise.)   Negative: Heart beating very slowly (e.g., < 50 / minute)  (Exception: Athlete and heart rate normal for caller.)   Negative: New or worsened shortness of breath with activity (dyspnea on exertion)   Negative: Patient sounds very sick or weak to the triager   Negative: Wearing a cardiac monitor (e.g., cardiac event, Holter)   Negative: Received SHOCK from implantable cardiac defibrillator (and now feels well)   Negative: Heart beating  very rapidly (e.g., > 140 / minute) and not present now  (Exception: During exercise.)   Negative: Skipped or extra beat(s) and increases with exercise or exertion   Negative: Skipped or extra beat(s) and occurs 4 or more times per minute   Negative: History of heart disease (i.e., heart attack, bypass surgery, angina, angioplasty)  (Exception: Brief heartbeat symptoms that went away and now feels well.)   Negative: Age > 60 years  (Exception: Brief heartbeat symptoms that went away and now feels well.)   Negative: Taking water pill (i.e., diuretic) or heart medication (e.g., digoxin)   Negative: Patient wants to be seen   Negative: New-onset or worsening ankle swelling   Negative: Heart rhythm alert (e.g., 'you have irregular heartbeat') from personal wearable device (e.g., Apple Watch)   Negative: History of hyperthyroidism or taking thyroid medication   Negative: Substance use (drug use) or misuse, known or suspected   Negative: ADHD and taking stimulant medicine    Protocols used: Heart Rate and Heartbeat [id number]

## 2025-07-09 ENCOUNTER — PATIENT MESSAGE (OUTPATIENT)
Dept: PRIMARY CARE CLINIC | Facility: CLINIC | Age: 30
End: 2025-07-09
Payer: COMMERCIAL

## 2025-08-14 ENCOUNTER — OFFICE VISIT (OUTPATIENT)
Dept: OBSTETRICS AND GYNECOLOGY | Facility: CLINIC | Age: 30
End: 2025-08-14

## 2025-08-14 VITALS
HEIGHT: 64 IN | BODY MASS INDEX: 28.85 KG/M2 | WEIGHT: 169 LBS | DIASTOLIC BLOOD PRESSURE: 78 MMHG | SYSTOLIC BLOOD PRESSURE: 112 MMHG

## 2025-08-14 DIAGNOSIS — Z01.419 WELL WOMAN EXAM WITH ROUTINE GYNECOLOGICAL EXAM: Primary | ICD-10-CM

## 2025-08-14 PROCEDURE — 99213 OFFICE O/P EST LOW 20 MIN: CPT | Mod: PBBFAC,PN | Performed by: STUDENT IN AN ORGANIZED HEALTH CARE EDUCATION/TRAINING PROGRAM

## 2025-08-14 PROCEDURE — 99395 PREV VISIT EST AGE 18-39: CPT | Mod: S$PBB,,, | Performed by: STUDENT IN AN ORGANIZED HEALTH CARE EDUCATION/TRAINING PROGRAM

## 2025-08-14 PROCEDURE — 99999 PR PBB SHADOW E&M-EST. PATIENT-LVL III: CPT | Mod: PBBFAC,,, | Performed by: STUDENT IN AN ORGANIZED HEALTH CARE EDUCATION/TRAINING PROGRAM

## (undated) DEVICE — DRAIN PENROSE XRAY 18 X 1/4 ST

## (undated) DEVICE — SUT VICRYL PLUS 3-0 SH 18IN

## (undated) DEVICE — DECANTER 6 VIAL

## (undated) DEVICE — DISSECTOR LIGASURE EXACT 21CM

## (undated) DEVICE — TOWEL OR DISP STRL BLUE 4/PK

## (undated) DEVICE — TIP YANKAUERS BULB NO VENT

## (undated) DEVICE — NDL SAFETY 22G X 1.5 ECLIPSE

## (undated) DEVICE — DRAIN PENROSE XRAY 12 X 1/4 ST

## (undated) DEVICE — DRAPE LAP T SHT W/ INSTR PAD

## (undated) DEVICE — SUT MCRYL PLUS 4-0 PS2 27IN

## (undated) DEVICE — ELECTRODE REM PLYHSV RETURN 9

## (undated) DEVICE — Device

## (undated) DEVICE — GLOVE BIOGEL SKINSENSE PI 8.0

## (undated) DEVICE — GOWN SMART IMP BREATHABLE XXLG

## (undated) DEVICE — SUT VICRYL PLUS 3-0 18IN

## (undated) DEVICE — NDL HYPO REG 25G X 1 1/2

## (undated) DEVICE — SUT ETHIBOND 0 CR/CT-2 8-18

## (undated) DEVICE — SYR B-D DISP CONTROL 10CC100/C

## (undated) DEVICE — GLOVE BIOGEL SKINSENSE PI 6.0

## (undated) DEVICE — GLOVE BIOGEL SKINSENSE PI 6.5

## (undated) DEVICE — SYS CLSR DERMABOND PRINEO 22CM

## (undated) DEVICE — DRAPE NEPHRSCPY SURG 72X118IN

## (undated) DEVICE — APPLICATOR CHLORAPREP ORN 26ML

## (undated) DEVICE — SOL PVP-I SCRUB 7.5% 4OZ